# Patient Record
Sex: FEMALE | Race: WHITE | NOT HISPANIC OR LATINO | Employment: OTHER | URBAN - METROPOLITAN AREA
[De-identification: names, ages, dates, MRNs, and addresses within clinical notes are randomized per-mention and may not be internally consistent; named-entity substitution may affect disease eponyms.]

---

## 2017-05-02 RX ORDER — DIGOXIN 250 MCG
TABLET ORAL
Qty: 30 TABLET | Refills: 5 | Status: SHIPPED | OUTPATIENT
Start: 2017-05-02 | End: 2017-10-26 | Stop reason: SDUPTHER

## 2017-07-28 ENCOUNTER — HOSPITAL ENCOUNTER (OUTPATIENT)
Dept: CARDIOLOGY | Facility: HOSPITAL | Age: 67
Discharge: HOME | End: 2017-07-28
Admitting: INTERNAL MEDICINE

## 2017-07-28 PROCEDURE — 36415 COLL VENOUS BLD VENIPUNCTURE: CPT | Performed by: INTERNAL MEDICINE

## 2017-07-28 PROCEDURE — 80053 COMPREHEN METABOLIC PANEL: CPT | Performed by: INTERNAL MEDICINE

## 2017-07-28 PROCEDURE — 80061 LIPID PANEL: CPT | Performed by: INTERNAL MEDICINE

## 2017-08-03 ENCOUNTER — OFFICE VISIT (OUTPATIENT)
Dept: CARDIOLOGY | Facility: CLINIC | Age: 67
End: 2017-08-03

## 2017-08-03 VITALS
WEIGHT: 227 LBS | HEIGHT: 67 IN | HEART RATE: 74 BPM | SYSTOLIC BLOOD PRESSURE: 148 MMHG | DIASTOLIC BLOOD PRESSURE: 70 MMHG | BODY MASS INDEX: 35.63 KG/M2

## 2017-08-03 DIAGNOSIS — R07.2 PRECORDIAL PAIN: ICD-10-CM

## 2017-08-03 DIAGNOSIS — R01.1 MURMUR: ICD-10-CM

## 2017-08-03 DIAGNOSIS — I49.1 PREMATURE ATRIAL CONTRACTION: ICD-10-CM

## 2017-08-03 DIAGNOSIS — I47.1 SUPRAVENTRICULAR TACHYCARDIA (HCC): Primary | ICD-10-CM

## 2017-08-03 DIAGNOSIS — E78.5 HYPERLIPIDEMIA, UNSPECIFIED HYPERLIPIDEMIA TYPE: ICD-10-CM

## 2017-08-03 DIAGNOSIS — R94.39 ABNORMAL STRESS ECG WITH TREADMILL: ICD-10-CM

## 2017-08-03 PROCEDURE — 93000 ELECTROCARDIOGRAM COMPLETE: CPT | Performed by: INTERNAL MEDICINE

## 2017-08-03 PROCEDURE — 99213 OFFICE O/P EST LOW 20 MIN: CPT | Performed by: INTERNAL MEDICINE

## 2017-08-03 NOTE — PROGRESS NOTES
Subjective:       Delaney Dunlap is a 67 y.o. female who here for follow up    CC  Follow-up for the chest pains supraventricular tachycardia as well as hyperlipidemia  HPI  67 his old white female with known history of supraventricular tachycardia, PACs hyperlipidemia as well as atypical chest pain here for the follow-up, continues to complains of shortness of breath on moderate exertion no different than before, no chest tightness in chest no palpitation no syncopal near-syncopal episodes     Problem List Items Addressed This Visit        Cardiovascular and Mediastinum    Hyperlipidemia    Relevant Orders    Treadmill Stress Test    Adult Transthoracic Echo Complete    Premature atrial contraction    Relevant Orders    ECG 12 Lead    Treadmill Stress Test    Adult Transthoracic Echo Complete    Supraventricular tachycardia - Primary    Relevant Orders    Treadmill Stress Test    Adult Transthoracic Echo Complete    Murmur    Relevant Orders    Treadmill Stress Test    Adult Transthoracic Echo Complete      Other Visit Diagnoses     Precordial pain         Relevant Orders    Treadmill Stress Test        .    The following portions of the patient's history were reviewed and updated as appropriate: allergies, current medications, past family history, past medical history, past social history, past surgical history and problem list.    Past Medical History:   Diagnosis Date   • Hyperlipidemia    • SVT (supraventricular tachycardia)     reports that she has never smoked. She has never used smokeless tobacco. She reports that she does not drink alcohol or use illicit drugs.  Family History   Problem Relation Age of Onset   • Heart attack Mother    • Arrhythmia Father    • Angina Father        Review of Systems  Constitutional: No wt loss, fever, fatigue  Gastrointestinal: No nausea, abdominal pain  Behavioral/Psych: No insomnia or anxiety   Cardiovascular No chest tightness  Objective:       Physical Exam           "   Physical Exam  /70  Pulse 74  Ht 67\" (170.2 cm)  Wt 227 lb (103 kg)  BMI 35.55 kg/m2    General appearance: NAD, conversant   Eyes: anicteric sclerae, moist conjunctivae; no lid-lag; PERRLA   HENT: Atraumatic; oropharynx clear with moist mucous membranes and no mucosal ulcerations;  normal hard and soft palate   Neck: Trachea midline; FROM, supple, no thyromegaly or lymphadenopathy   Lungs: CTA, with normal respiratory effort and no intercostal retractions   CV: S1-S2 regular, sys murmurs, no rub, no gallop   Abdomen: Soft, non-tender; no masses or HSM   Extremities: No peripheral edema or extremity lymphadenopathy  Skin: Normal temperature, turgor and texture; no rash, ulcers or subcutaneous nodules   Psych: Appropriate affect, alert and oriented to person, place and time           Cardiographics  @  ECG 12 Lead  Date/Time: 8/3/2017 2:06 PM  Performed by: LAUREL GUERIN  Authorized by: LAUREL GUERNI   Comparison: compared with previous ECG   Similar to previous ECG  Rhythm: sinus rhythm  ST Segments: ST segments normal  Clinical impression: normal ECG             Ref Range & Units 6d ago     Total Cholesterol 0 - 200 mg/dL 176   Triglycerides 0 - 150 mg/dL 234 (H)   HDL Cholesterol 40 - 60 mg/dL 35 (L)   LDL Cholesterol  0 - 100 mg/dL 94   VLDL Cholesterol 5 - 40 mg/dL 46.8 (H)   LDL/HDL Ratio  2.69     Glucose 65 - 99 mg/dL 165 (H)   BUN 8 - 23 mg/dL 13   Creatinine 0.57 - 1.00 mg/dL 0.77   Sodium 136 - 145 mmol/L 140   Potassium 3.5 - 5.2 mmol/L 4.8   Chloride 98 - 107 mmol/L 101   CO2 22.0 - 29.0 mmol/L 25.8   Calcium 8.6 - 10.5 mg/dL 9.6   Total Protein 6.0 - 8.5 g/dL 7.5   Albumin 3.50 - 5.20 g/dL 4.80   ALT (SGPT) 1 - 33 U/L 27   AST (SGOT) 1 - 32 U/L 19   Alkaline Phosphatase 39 - 117 U/L 91   Total Bilirubin 0.1 - 1.2 mg/dL 0.5   eGFR Non African Amer >60 mL/min/1.73 75   Globulin gm/dL 2.7   A/G Ratio g/dL 1.8   BUN/Creatinine Ratio 7.0 - 25.0 16.9   Anion Gap mmol/L 13.2 "   Resulting Agency  [facility]      Specimen Collected: 07/28/17  8:57 AM   Last Resulted: 07/28/17  4:42 PM            Echocardiogram:        Current Outpatient Prescriptions:   •  atorvastatin (LIPITOR) 10 MG tablet, Take 1 tablet by mouth Daily., Disp: 30 tablet, Rfl: 10  •  digoxin (LANOXIN) 250 MCG tablet, TAKE ONE TABLET BY MOUTH DAILY, Disp: 30 tablet, Rfl: 5  •  travoprost, YAHAIRA free, (TRAVATAN) 0.004 % solution ophthalmic solution, 1 drop every evening. in affected eye(s), Disp: , Rfl:    Assessment:        Patient Active Problem List   Diagnosis   • Hyperlipidemia   • Premature atrial contraction   • Supraventricular tachycardia   • Dyslipidemia               Plan:            ICD-10-CM ICD-9-CM   1. Supraventricular tachycardia I47.1 427.89   2. Premature atrial contraction I49.1 427.61   3. Hyperlipidemia, unspecified hyperlipidemia type E78.5 272.4   4. Murmur R01.1 785.2   5. Precordial pain  R07.2 786.51     1. Supraventricular tachycardia  Under control at this stage  - Treadmill Stress Test  - Adult Transthoracic Echo Complete    2. Premature atrial contraction  Well-controlled  - ECG 12 Lead  - Treadmill Stress Test  - Adult Transthoracic Echo Complete    3. Hyperlipidemia, unspecified hyperlipidemia type  Counseling has been done  - Treadmill Stress Test  - Adult Transthoracic Echo Complete    4. Murmur    - Treadmill Stress Test  - Adult Transthoracic Echo Complete    5. Precordial pain     - Treadmill Stress Test  ett/ echo    See in 1 yr with labs    Newly diagnosed diabetes    Pros and cons of ASA in primary and secondary prevention of CAD has been discussed.  Risks of bleeding and other possible side effects have been discussed, Diff advantages and disadvantages of 325 vs 81  Mg of ASA were discussed, at this stage it has been recommended to start ASA 81 mg /day     COUNSELING:    Delaney Peña was given to patient for the following topics: diagnostic results, risk factor  reductions, impressions, risks and benefits of treatment options and importance of treatment compliance .       SMOKING COUNSELING:    Counseling given: Not Answered      EMR Dragon/Transcription disclaimer:   Much of this encounter note is an electronic transcription/translation of spoken language to printed text. The electronic translation of spoken language may permit erroneous, or at times, nonsensical words or phrases to be inadvertently transcribed; Although I have reviewed the note for such errors, some may still exist.

## 2017-08-16 ENCOUNTER — HOSPITAL ENCOUNTER (OUTPATIENT)
Dept: CARDIOLOGY | Facility: HOSPITAL | Age: 67
Discharge: HOME | End: 2017-08-16
Attending: INTERNAL MEDICINE

## 2017-08-16 ENCOUNTER — HOSPITAL ENCOUNTER (OUTPATIENT)
Dept: CARDIOLOGY | Facility: HOSPITAL | Age: 67
Discharge: HOME | End: 2017-08-16
Attending: INTERNAL MEDICINE | Admitting: INTERNAL MEDICINE

## 2017-08-16 VITALS
RESPIRATION RATE: 20 BRPM | SYSTOLIC BLOOD PRESSURE: 130 MMHG | DIASTOLIC BLOOD PRESSURE: 72 MMHG | OXYGEN SATURATION: 98 % | HEART RATE: 63 BPM

## 2017-08-16 VITALS
SYSTOLIC BLOOD PRESSURE: 134 MMHG | BODY MASS INDEX: 36.6 KG/M2 | DIASTOLIC BLOOD PRESSURE: 76 MMHG | WEIGHT: 233.2 LBS | HEART RATE: 68 BPM | HEIGHT: 67 IN

## 2017-08-16 LAB
BH CV ECHO MEAS - ACS: 1.8 CM
BH CV ECHO MEAS - AO MAX PG (FULL): 11.3 MMHG
BH CV ECHO MEAS - AO MAX PG: 15.2 MMHG
BH CV ECHO MEAS - AO MEAN PG (FULL): 6.5 MMHG
BH CV ECHO MEAS - AO MEAN PG: 8.5 MMHG
BH CV ECHO MEAS - AO ROOT AREA (BSA CORRECTED): 1.4
BH CV ECHO MEAS - AO ROOT AREA: 7.1 CM^2
BH CV ECHO MEAS - AO ROOT DIAM: 3 CM
BH CV ECHO MEAS - AO V2 MAX: 195 CM/SEC
BH CV ECHO MEAS - AO V2 MEAN: 136 CM/SEC
BH CV ECHO MEAS - AO V2 VTI: 43.5 CM
BH CV ECHO MEAS - AVA(I,A): 1.7 CM^2
BH CV ECHO MEAS - AVA(I,D): 1.7 CM^2
BH CV ECHO MEAS - AVA(V,A): 1.4 CM^2
BH CV ECHO MEAS - AVA(V,D): 1.4 CM^2
BH CV ECHO MEAS - BSA(HAYCOCK): 2.3 M^2
BH CV ECHO MEAS - BSA: 2.2 M^2
BH CV ECHO MEAS - BZI_BMI: 36.5 KILOGRAMS/M^2
BH CV ECHO MEAS - BZI_METRIC_HEIGHT: 170.2 CM
BH CV ECHO MEAS - BZI_METRIC_WEIGHT: 105.7 KG
BH CV ECHO MEAS - CONTRAST EF (2CH): 63.2 ML/M^2
BH CV ECHO MEAS - CONTRAST EF 4CH: 70.8 ML/M^2
BH CV ECHO MEAS - EDV(CUBED): 117.6 ML
BH CV ECHO MEAS - EDV(MOD-SP2): 106 ML
BH CV ECHO MEAS - EDV(MOD-SP4): 96 ML
BH CV ECHO MEAS - EDV(TEICH): 112.8 ML
BH CV ECHO MEAS - EF(CUBED): 74.4 %
BH CV ECHO MEAS - EF(MOD-SP2): 63.2 %
BH CV ECHO MEAS - EF(MOD-SP4): 70.8 %
BH CV ECHO MEAS - EF(TEICH): 66.1 %
BH CV ECHO MEAS - ESV(CUBED): 30.1 ML
BH CV ECHO MEAS - ESV(MOD-SP2): 39 ML
BH CV ECHO MEAS - ESV(MOD-SP4): 28 ML
BH CV ECHO MEAS - ESV(TEICH): 38.2 ML
BH CV ECHO MEAS - FS: 36.5 %
BH CV ECHO MEAS - IVS/LVPW: 1.3
BH CV ECHO MEAS - IVSD: 1.3 CM
BH CV ECHO MEAS - LAT PEAK E' VEL: 11.7 CM/SEC
BH CV ECHO MEAS - LV DIASTOLIC VOL/BSA (35-75): 44.5 ML/M^2
BH CV ECHO MEAS - LV MASS(C)D: 213.3 GRAMS
BH CV ECHO MEAS - LV MASS(C)DI: 98.8 GRAMS/M^2
BH CV ECHO MEAS - LV MAX PG: 3.9 MMHG
BH CV ECHO MEAS - LV MEAN PG: 2 MMHG
BH CV ECHO MEAS - LV SYSTOLIC VOL/BSA (12-30): 13 ML/M^2
BH CV ECHO MEAS - LV V1 MAX: 99.3 CM/SEC
BH CV ECHO MEAS - LV V1 MEAN: 72.8 CM/SEC
BH CV ECHO MEAS - LV V1 VTI: 25.6 CM
BH CV ECHO MEAS - LVIDD: 4.9 CM
BH CV ECHO MEAS - LVIDS: 3.1 CM
BH CV ECHO MEAS - LVLD AP2: 8.2 CM
BH CV ECHO MEAS - LVLD AP4: 7.9 CM
BH CV ECHO MEAS - LVLS AP2: 5.9 CM
BH CV ECHO MEAS - LVLS AP4: 5.7 CM
BH CV ECHO MEAS - LVOT AREA (M): 2.8 CM^2
BH CV ECHO MEAS - LVOT AREA: 2.8 CM^2
BH CV ECHO MEAS - LVOT DIAM: 1.9 CM
BH CV ECHO MEAS - LVPWD: 1 CM
BH CV ECHO MEAS - MED PEAK E' VEL: 5.5 CM/SEC
BH CV ECHO MEAS - MR MAX PG: 98 MMHG
BH CV ECHO MEAS - MR MAX VEL: 494.5 CM/SEC
BH CV ECHO MEAS - MV A DUR: 0.14 SEC
BH CV ECHO MEAS - MV A MAX VEL: 94.3 CM/SEC
BH CV ECHO MEAS - MV DEC SLOPE: 449 CM/SEC^2
BH CV ECHO MEAS - MV DEC TIME: 0.26 SEC
BH CV ECHO MEAS - MV E MAX VEL: 113 CM/SEC
BH CV ECHO MEAS - MV E/A: 1.2
BH CV ECHO MEAS - MV MAX PG: 7.6 MMHG
BH CV ECHO MEAS - MV MEAN PG: 4 MMHG
BH CV ECHO MEAS - MV P1/2T MAX VEL: 141 CM/SEC
BH CV ECHO MEAS - MV P1/2T: 92 MSEC
BH CV ECHO MEAS - MV V2 MAX: 138 CM/SEC
BH CV ECHO MEAS - MV V2 MEAN: 91.7 CM/SEC
BH CV ECHO MEAS - MV V2 VTI: 41.4 CM
BH CV ECHO MEAS - MVA P1/2T LCG: 1.6 CM^2
BH CV ECHO MEAS - MVA(P1/2T): 2.4 CM^2
BH CV ECHO MEAS - MVA(VTI): 1.8 CM^2
BH CV ECHO MEAS - PA MAX PG (FULL): 3 MMHG
BH CV ECHO MEAS - PA MAX PG: 4.5 MMHG
BH CV ECHO MEAS - PA V2 MAX: 106 CM/SEC
BH CV ECHO MEAS - PULM A REVS DUR: 0.14 SEC
BH CV ECHO MEAS - PULM A REVS VEL: 28.6 CM/SEC
BH CV ECHO MEAS - PULM DIAS VEL: 55 CM/SEC
BH CV ECHO MEAS - PULM S/D: 0.88
BH CV ECHO MEAS - PULM SYS VEL: 48.5 CM/SEC
BH CV ECHO MEAS - PVA(V,A): 2.6 CM^2
BH CV ECHO MEAS - PVA(V,D): 2.6 CM^2
BH CV ECHO MEAS - QP/QS: 0.85
BH CV ECHO MEAS - RAP SYSTOLE: 10 MMHG
BH CV ECHO MEAS - RV MAX PG: 1.4 MMHG
BH CV ECHO MEAS - RV MEAN PG: 1 MMHG
BH CV ECHO MEAS - RV V1 MAX: 60.2 CM/SEC
BH CV ECHO MEAS - RV V1 MEAN: 44.7 CM/SEC
BH CV ECHO MEAS - RV V1 VTI: 13.7 CM
BH CV ECHO MEAS - RVOT AREA: 4.5 CM^2
BH CV ECHO MEAS - RVOT DIAM: 2.4 CM
BH CV ECHO MEAS - RVSP: 18.4 MMHG
BH CV ECHO MEAS - SI(AO): 142.3 ML/M^2
BH CV ECHO MEAS - SI(CUBED): 40.6 ML/M^2
BH CV ECHO MEAS - SI(LVOT): 33.6 ML/M^2
BH CV ECHO MEAS - SI(MOD-SP2): 31.1 ML/M^2
BH CV ECHO MEAS - SI(MOD-SP4): 31.5 ML/M^2
BH CV ECHO MEAS - SI(TEICH): 34.6 ML/M^2
BH CV ECHO MEAS - SV(AO): 307.1 ML
BH CV ECHO MEAS - SV(CUBED): 87.6 ML
BH CV ECHO MEAS - SV(LVOT): 72.6 ML
BH CV ECHO MEAS - SV(MOD-SP2): 67 ML
BH CV ECHO MEAS - SV(MOD-SP4): 68 ML
BH CV ECHO MEAS - SV(RVOT): 62 ML
BH CV ECHO MEAS - SV(TEICH): 74.6 ML
BH CV ECHO MEAS - TAPSE (>1.6): 2.2 CM2
BH CV ECHO MEAS - TR MAX VEL: 145 CM/SEC
BH CV STRESS BP STAGE 1: NORMAL
BH CV STRESS BP STAGE 2: NORMAL
BH CV STRESS BP STAGE 3: NORMAL
BH CV STRESS DURATION MIN STAGE 1: 3
BH CV STRESS DURATION MIN STAGE 2: 3
BH CV STRESS DURATION MIN STAGE 3: 1
BH CV STRESS DURATION SEC STAGE 1: 0
BH CV STRESS DURATION SEC STAGE 2: 0
BH CV STRESS DURATION SEC STAGE 3: 13
BH CV STRESS GRADE STAGE 1: 10
BH CV STRESS GRADE STAGE 2: 12
BH CV STRESS GRADE STAGE 3: 14
BH CV STRESS HR STAGE 1: 108
BH CV STRESS HR STAGE 2: 134
BH CV STRESS HR STAGE 3: 150
BH CV STRESS METS STAGE 1: 4.6
BH CV STRESS METS STAGE 2: 7
BH CV STRESS METS STAGE 3: 8.2
BH CV STRESS O2 STAGE 3: 97
BH CV STRESS PROTOCOL 1: NORMAL
BH CV STRESS RECOVERY BP: NORMAL MMHG
BH CV STRESS RECOVERY HR: 89 BPM
BH CV STRESS RECOVERY O2: 99 %
BH CV STRESS SPEED STAGE 1: 1.7
BH CV STRESS SPEED STAGE 2: 2.5
BH CV STRESS SPEED STAGE 3: 3.1
BH CV STRESS STAGE 1: 1
BH CV STRESS STAGE 2: 2
BH CV STRESS STAGE 3: 3
BH CV XLRA - RV BASE: 3.8 CM
BH CV XLRA - RV LENGTH: 7.8 CM
BH CV XLRA - RV MID: 2.5 CM
BH CV XLRA - TDI S': 12.5 CM/SEC
LEFT ATRIUM VOLUME INDEX: 35.2 ML/M2
MAXIMAL PREDICTED HEART RATE: 154 BPM
PERCENT MAX PREDICTED HR: 97.4 %
STRESS BASELINE BP: NORMAL MMHG
STRESS BASELINE HR: 74 BPM
STRESS O2 SAT REST: 98 %
STRESS PERCENT HR: 115 %
STRESS POST ESTIMATED WORKLOAD: 8.2 METS
STRESS POST EXERCISE DUR MIN: 7 MIN
STRESS POST EXERCISE DUR SEC: 13 SEC
STRESS POST O2 SAT PEAK: 97 %
STRESS POST PEAK BP: NORMAL MMHG
STRESS POST PEAK HR: 150 BPM
STRESS TARGET HR: 131 BPM

## 2017-08-16 PROCEDURE — 93017 CV STRESS TEST TRACING ONLY: CPT

## 2017-08-16 PROCEDURE — 93306 TTE W/DOPPLER COMPLETE: CPT | Performed by: INTERNAL MEDICINE

## 2017-08-16 PROCEDURE — 93018 CV STRESS TEST I&R ONLY: CPT | Performed by: INTERNAL MEDICINE

## 2017-08-16 PROCEDURE — 93306 TTE W/DOPPLER COMPLETE: CPT

## 2017-08-16 PROCEDURE — 93016 CV STRESS TEST SUPVJ ONLY: CPT | Performed by: INTERNAL MEDICINE

## 2017-08-24 ENCOUNTER — HOSPITAL ENCOUNTER (OUTPATIENT)
Dept: NUCLEAR MEDICINE | Facility: HOSPITAL | Age: 67
Discharge: HOME | End: 2017-08-24
Attending: INTERNAL MEDICINE

## 2017-08-24 LAB
BH CV STRESS BP STAGE 1: NORMAL
BH CV STRESS BP STAGE 2: NORMAL
BH CV STRESS BP STAGE 3: NORMAL
BH CV STRESS DURATION MIN STAGE 1: 3
BH CV STRESS DURATION MIN STAGE 2: 3
BH CV STRESS DURATION MIN STAGE 3: 0
BH CV STRESS DURATION SEC STAGE 1: 0
BH CV STRESS DURATION SEC STAGE 2: 0
BH CV STRESS DURATION SEC STAGE 3: 23
BH CV STRESS GRADE STAGE 1: 10
BH CV STRESS GRADE STAGE 2: 12
BH CV STRESS GRADE STAGE 3: 14
BH CV STRESS HR STAGE 1: 113
BH CV STRESS HR STAGE 2: 134
BH CV STRESS HR STAGE 3: 137
BH CV STRESS METS STAGE 1: 5
BH CV STRESS METS STAGE 2: 7.5
BH CV STRESS METS STAGE 3: 10
BH CV STRESS PROTOCOL 1: NORMAL
BH CV STRESS RECOVERY BP: NORMAL MMHG
BH CV STRESS RECOVERY HR: 83 BPM
BH CV STRESS SPEED STAGE 1: 1.7
BH CV STRESS SPEED STAGE 2: 2.5
BH CV STRESS SPEED STAGE 3: 3.4
BH CV STRESS STAGE 1: 1
BH CV STRESS STAGE 2: 2
BH CV STRESS STAGE 3: 3
LV EF NUC BP: 60 %
MAXIMAL PREDICTED HEART RATE: 154 BPM
PERCENT MAX PREDICTED HR: 89.61 %
STRESS BASELINE BP: NORMAL MMHG
STRESS BASELINE HR: 67 BPM
STRESS PERCENT HR: 105 %
STRESS POST ESTIMATED WORKLOAD: 7.6 METS
STRESS POST EXERCISE DUR MIN: 6 MIN
STRESS POST EXERCISE DUR SEC: 23 SEC
STRESS POST PEAK BP: NORMAL MMHG
STRESS POST PEAK HR: 138 BPM
STRESS TARGET HR: 131 BPM

## 2017-08-24 PROCEDURE — 78452 HT MUSCLE IMAGE SPECT MULT: CPT | Performed by: INTERNAL MEDICINE

## 2017-08-24 PROCEDURE — A9500 TC99M SESTAMIBI: HCPCS | Performed by: INTERNAL MEDICINE

## 2017-08-24 PROCEDURE — 78452 HT MUSCLE IMAGE SPECT MULT: CPT

## 2017-08-24 PROCEDURE — 93017 CV STRESS TEST TRACING ONLY: CPT

## 2017-08-24 PROCEDURE — 93016 CV STRESS TEST SUPVJ ONLY: CPT | Performed by: INTERNAL MEDICINE

## 2017-08-24 PROCEDURE — 0 TECHNETIUM SESTAMIBI: Performed by: INTERNAL MEDICINE

## 2017-08-24 PROCEDURE — 93018 CV STRESS TEST I&R ONLY: CPT | Performed by: INTERNAL MEDICINE

## 2017-08-24 RX ADMIN — TECHNETIUM TC-99M SESTAMIBI 1 DOSE: 1 INJECTION INTRAVENOUS at 07:45

## 2017-08-24 RX ADMIN — TECHNETIUM TC-99M SESTAMIBI 1 DOSE: 1 INJECTION INTRAVENOUS at 09:25

## 2017-08-25 RX ORDER — ATORVASTATIN CALCIUM 10 MG/1
TABLET, FILM COATED ORAL
Qty: 30 TABLET | Refills: 9 | Status: SHIPPED | OUTPATIENT
Start: 2017-08-25 | End: 2018-04-21 | Stop reason: SDUPTHER

## 2017-10-26 RX ORDER — DIGOXIN 250 MCG
250 TABLET ORAL
Qty: 30 TABLET | Refills: 5 | Status: SHIPPED | OUTPATIENT
Start: 2017-10-26 | End: 2017-12-01 | Stop reason: SDUPTHER

## 2017-11-30 ENCOUNTER — TELEPHONE (OUTPATIENT)
Dept: CARDIOLOGY | Age: 67
End: 2017-11-30

## 2017-12-01 RX ORDER — DIGOXIN 250 MCG
250 TABLET ORAL
Qty: 30 TABLET | Refills: 5 | Status: SHIPPED | OUTPATIENT
Start: 2017-12-01 | End: 2018-07-20 | Stop reason: SDUPTHER

## 2018-04-23 RX ORDER — DIGOXIN 250 MCG
TABLET ORAL
Qty: 30 TABLET | Refills: 0 | Status: SHIPPED | OUTPATIENT
Start: 2018-04-23 | End: 2018-05-24 | Stop reason: SDUPTHER

## 2018-04-23 RX ORDER — ATORVASTATIN CALCIUM 10 MG/1
TABLET, FILM COATED ORAL
Qty: 90 TABLET | Refills: 0 | Status: SHIPPED | OUTPATIENT
Start: 2018-04-23 | End: 2018-07-20 | Stop reason: SDUPTHER

## 2018-05-25 RX ORDER — DIGOXIN 250 MCG
TABLET ORAL
Qty: 30 TABLET | Refills: 1 | Status: SHIPPED | OUTPATIENT
Start: 2018-05-25 | End: 2019-04-03 | Stop reason: SDUPTHER

## 2018-07-20 ENCOUNTER — HOSPITAL ENCOUNTER (OUTPATIENT)
Dept: CARDIOLOGY | Facility: HOSPITAL | Age: 68
Discharge: HOME | End: 2018-07-20
Admitting: INTERNAL MEDICINE

## 2018-07-20 DIAGNOSIS — E78.5 HYPERLIPIDEMIA, UNSPECIFIED HYPERLIPIDEMIA TYPE: ICD-10-CM

## 2018-07-20 DIAGNOSIS — I47.1 SUPRAVENTRICULAR TACHYCARDIA (HCC): ICD-10-CM

## 2018-07-20 LAB
ALBUMIN SERPL-MCNC: 4.8 G/DL (ref 3.5–5.2)
ALBUMIN/GLOB SERPL: 1.8 G/DL
ALP SERPL-CCNC: 94 U/L (ref 39–117)
ALT SERPL W P-5'-P-CCNC: 38 U/L (ref 1–33)
ANION GAP SERPL CALCULATED.3IONS-SCNC: 14.2 MMOL/L
AST SERPL-CCNC: 29 U/L (ref 1–32)
BILIRUB SERPL-MCNC: 0.5 MG/DL (ref 0.1–1.2)
BUN BLD-MCNC: 14 MG/DL (ref 8–23)
BUN/CREAT SERPL: 20 (ref 7–25)
CALCIUM SPEC-SCNC: 9.9 MG/DL (ref 8.6–10.5)
CHLORIDE SERPL-SCNC: 100 MMOL/L (ref 98–107)
CHOLEST SERPL-MCNC: 178 MG/DL (ref 0–200)
CO2 SERPL-SCNC: 24.8 MMOL/L (ref 22–29)
CREAT BLD-MCNC: 0.7 MG/DL (ref 0.57–1)
GFR SERPL CREATININE-BSD FRML MDRD: 83 ML/MIN/1.73
GLOBULIN UR ELPH-MCNC: 2.6 GM/DL
GLUCOSE BLD-MCNC: 221 MG/DL (ref 65–99)
HDLC SERPL-MCNC: 40 MG/DL (ref 40–60)
LDLC SERPL CALC-MCNC: 90 MG/DL (ref 0–100)
LDLC/HDLC SERPL: 2.26 {RATIO}
POTASSIUM BLD-SCNC: 4.7 MMOL/L (ref 3.5–5.2)
PROT SERPL-MCNC: 7.4 G/DL (ref 6–8.5)
SODIUM BLD-SCNC: 139 MMOL/L (ref 136–145)
TRIGL SERPL-MCNC: 238 MG/DL (ref 0–150)
VLDLC SERPL-MCNC: 47.6 MG/DL (ref 5–40)

## 2018-07-20 PROCEDURE — 80061 LIPID PANEL: CPT | Performed by: INTERNAL MEDICINE

## 2018-07-20 PROCEDURE — 80053 COMPREHEN METABOLIC PANEL: CPT | Performed by: INTERNAL MEDICINE

## 2018-07-20 RX ORDER — ATORVASTATIN CALCIUM 10 MG/1
10 TABLET, FILM COATED ORAL DAILY
Qty: 90 TABLET | Refills: 2 | Status: SHIPPED | OUTPATIENT
Start: 2018-07-20 | End: 2018-10-08

## 2018-07-20 RX ORDER — DIGOXIN 250 MCG
250 TABLET ORAL
Qty: 90 TABLET | Refills: 2 | Status: SHIPPED | OUTPATIENT
Start: 2018-07-20 | End: 2018-07-30 | Stop reason: SDUPTHER

## 2018-07-30 ENCOUNTER — OFFICE VISIT (OUTPATIENT)
Dept: CARDIOLOGY | Facility: CLINIC | Age: 68
End: 2018-07-30

## 2018-07-30 VITALS
HEART RATE: 73 BPM | DIASTOLIC BLOOD PRESSURE: 86 MMHG | HEIGHT: 67 IN | WEIGHT: 226 LBS | SYSTOLIC BLOOD PRESSURE: 159 MMHG | BODY MASS INDEX: 35.47 KG/M2

## 2018-07-30 DIAGNOSIS — R89.9 ABNORMAL LABORATORY TEST: ICD-10-CM

## 2018-07-30 DIAGNOSIS — E78.5 DYSLIPIDEMIA: ICD-10-CM

## 2018-07-30 DIAGNOSIS — R03.0 BORDERLINE SYSTOLIC HTN: ICD-10-CM

## 2018-07-30 DIAGNOSIS — I47.1 SUPRAVENTRICULAR TACHYCARDIA (HCC): Primary | ICD-10-CM

## 2018-07-30 PROCEDURE — 99213 OFFICE O/P EST LOW 20 MIN: CPT | Performed by: INTERNAL MEDICINE

## 2018-07-30 PROCEDURE — 93000 ELECTROCARDIOGRAM COMPLETE: CPT | Performed by: INTERNAL MEDICINE

## 2018-10-04 ENCOUNTER — OFFICE VISIT (OUTPATIENT)
Dept: ENDOCRINOLOGY | Age: 68
End: 2018-10-04

## 2018-10-04 VITALS
WEIGHT: 225 LBS | BODY MASS INDEX: 35.31 KG/M2 | SYSTOLIC BLOOD PRESSURE: 124 MMHG | RESPIRATION RATE: 16 BRPM | HEIGHT: 67 IN | DIASTOLIC BLOOD PRESSURE: 72 MMHG

## 2018-10-04 DIAGNOSIS — R53.82 CHRONIC FATIGUE: ICD-10-CM

## 2018-10-04 DIAGNOSIS — E78.5 HYPERLIPIDEMIA, UNSPECIFIED HYPERLIPIDEMIA TYPE: ICD-10-CM

## 2018-10-04 DIAGNOSIS — E66.9 CLASS 2 OBESITY WITHOUT SERIOUS COMORBIDITY WITH BODY MASS INDEX (BMI) OF 36.0 TO 36.9 IN ADULT, UNSPECIFIED OBESITY TYPE: ICD-10-CM

## 2018-10-04 DIAGNOSIS — E55.9 VITAMIN D DEFICIENCY: ICD-10-CM

## 2018-10-04 DIAGNOSIS — E11.9 TYPE 2 DIABETES MELLITUS WITHOUT COMPLICATION, WITHOUT LONG-TERM CURRENT USE OF INSULIN (HCC): Primary | ICD-10-CM

## 2018-10-04 DIAGNOSIS — R03.0 BORDERLINE SYSTOLIC HTN: ICD-10-CM

## 2018-10-04 PROBLEM — E66.812 CLASS 2 OBESITY WITHOUT SERIOUS COMORBIDITY WITH BODY MASS INDEX (BMI) OF 36.0 TO 36.9 IN ADULT: Status: ACTIVE | Noted: 2018-10-04

## 2018-10-04 PROCEDURE — 99205 OFFICE O/P NEW HI 60 MIN: CPT | Performed by: INTERNAL MEDICINE

## 2018-10-04 RX ORDER — MV-MN/OM3/DHA/EPA/FISH/LUT/ZEA 250-5-1 MG
CAPSULE ORAL
COMMUNITY
End: 2019-02-13 | Stop reason: SDUPTHER

## 2018-10-04 RX ORDER — BLOOD SUGAR DIAGNOSTIC
STRIP MISCELLANEOUS
Qty: 60 EACH | Refills: 11 | Status: SHIPPED | OUTPATIENT
Start: 2018-10-04 | End: 2018-10-08 | Stop reason: SDUPTHER

## 2018-10-04 RX ORDER — EMPAGLIFLOZIN, METFORMIN HYDROCHLORIDE 12.5; 1 MG/1; MG/1
2 TABLET, EXTENDED RELEASE ORAL DAILY
Qty: 60 TABLET | Refills: 5 | Status: SHIPPED | OUTPATIENT
Start: 2018-10-04 | End: 2019-04-22 | Stop reason: SDUPTHER

## 2018-10-04 RX ORDER — ANTIOX #8/OM3/DHA/EPA/LUT/ZEAX 250-2.5 MG
1 CAPSULE ORAL 2 TIMES DAILY
COMMUNITY

## 2018-10-04 RX ORDER — LANCETS
EACH MISCELLANEOUS
Qty: 60 EACH | Refills: 5 | Status: SHIPPED | OUTPATIENT
Start: 2018-10-04 | End: 2018-10-08 | Stop reason: SDUPTHER

## 2018-10-04 RX ORDER — EXENATIDE 2 MG/.85ML
2 INJECTION, SUSPENSION, EXTENDED RELEASE SUBCUTANEOUS WEEKLY
Qty: 4 PEN | Refills: 5 | Status: SHIPPED | OUTPATIENT
Start: 2018-10-04 | End: 2019-02-26 | Stop reason: SDUPTHER

## 2018-10-04 NOTE — PROGRESS NOTES
"Subjective   Delaney Dunlap is a 68 y.o. female seen as a new patient for abnormal labs. Patient states that cardiologist told her she had diabetes but she does not have an A1C result. She is not checking BG. She is having frequency and urgency.     History of Present Illness this is a 68-year-old female who has been told to have diabetes on a routine laboratory evaluation warm on 07/20/2018 and her blood sugar was 220.  She is on known patient with borderline high blood pressure and atrial fibrillation and heart failure with dyslipidemia.  She says she has been told to have low levels of vitamin D for a very long time.  Diabetes runs in her family from parents and siblings and also there is high incidence of heart disease and stroke in her family.  She expressed frustration with the fact that she continues to gain weight with very little success in doing so.  She also complained about not having enough energy as she used to have.    /72   Resp 16   Ht 170.2 cm (67\")   Wt 102 kg (225 lb)   BMI 35.24 kg/m²      Allergies   Allergen Reactions   • Erythromycin    • Erythromycin Base    • Penicillins        Current Outpatient Prescriptions:   •  atorvastatin (LIPITOR) 10 MG tablet, Take 1 tablet by mouth Daily., Disp: 90 tablet, Rfl: 2  •  digoxin (LANOXIN) 250 MCG tablet, TAKE ONE TABLET BY MOUTH DAILY, Disp: 30 tablet, Rfl: 1  •  Multiple Vitamins-Minerals (MULTIVITAMIN WOMEN 50+) tablet, Take  by mouth., Disp: , Rfl:   •  Multiple Vitamins-Minerals (OCUVITE ADULT 50+) capsule, Take  by mouth., Disp: , Rfl:   •  multivitamins-minerals (PRESERVISION AREDS 2) capsule capsule, Take 1 capsule by mouth 2 (Two) Times a Day., Disp: , Rfl:   •  travoprost, YAHAIRA free, (TRAVATAN) 0.004 % solution ophthalmic solution, 1 drop every evening. in affected eye(s), Disp: , Rfl:       The following portions of the patient's history were reviewed and updated as appropriate: allergies, current medications, past family " history, past medical history, past social history, past surgical history and problem list.    Review of Systems   Constitutional: Negative.  Negative for fatigue.   HENT: Negative.    Eyes: Negative.    Respiratory: Negative.    Cardiovascular: Negative.  Negative for chest pain.   Gastrointestinal: Negative.    Endocrine: Negative.  Negative for polydipsia, polyphagia and polyuria.   Genitourinary: Positive for frequency and urgency.   Musculoskeletal: Negative.    Skin: Negative.  Negative for pallor.   Allergic/Immunologic: Negative.    Neurological: Negative.  Negative for dizziness, tremors, seizures, speech difficulty, weakness and headaches.   Hematological: Negative.    Psychiatric/Behavioral: Negative.  Negative for confusion. The patient is not nervous/anxious.        Objective   Physical Exam   Constitutional: She is oriented to person, place, and time. She appears well-developed and well-nourished. No distress.   HENT:   Head: Normocephalic and atraumatic.   Right Ear: External ear normal.   Left Ear: External ear normal.   Nose: Nose normal.   Mouth/Throat: Oropharynx is clear and moist. No oropharyngeal exudate.   Eyes: Pupils are equal, round, and reactive to light. Conjunctivae and EOM are normal. Right eye exhibits no discharge. Left eye exhibits no discharge. No scleral icterus.   Neck: Normal range of motion. Neck supple. No JVD present. No tracheal deviation present. No thyromegaly present.   Cardiovascular: Normal rate, regular rhythm, normal heart sounds and intact distal pulses.  Exam reveals no gallop and no friction rub.    No murmur heard.  Pulmonary/Chest: Effort normal and breath sounds normal. No stridor. No respiratory distress. She has no wheezes. She has no rales. She exhibits no tenderness.   Abdominal: Soft. Bowel sounds are normal. She exhibits no distension and no mass. There is no tenderness. There is no rebound and no guarding. No hernia.   Musculoskeletal: Normal range of  motion. She exhibits no edema, tenderness or deformity.   Lymphadenopathy:     She has no cervical adenopathy.   Neurological: She is alert and oriented to person, place, and time. She has normal reflexes. She displays normal reflexes. No cranial nerve deficit or sensory deficit. She exhibits normal muscle tone. Coordination normal.   Skin: Skin is warm and dry. No rash noted. She is not diaphoretic. No erythema. No pallor.   Diffuse vitiligo.   Psychiatric: She has a normal mood and affect. Her behavior is normal. Judgment and thought content normal.   Nursing note and vitals reviewed.        Assessment/Plan   Diagnoses and all orders for this visit:    Type 2 diabetes mellitus without complication, without long-term current use of insulin (CMS/[facility])  -     T4 & TSH ([facility])  -     Uric Acid  -     Vitamin D 25 Hydroxy  -     Comprehensive Metabolic Panel  -     C-Peptide  -     Hemoglobin A1c  -     MicroAlbumin, Urine, Random - Urine, Clean Catch  -     ACTH  -     Cortisol  -     Calcium, Ionized  -     Phosphorus  -     PTH, Intact  -     NMR LipoProfile  -     Cancel: T4 & TSH ([facility]); Future  -     Cancel: Uric Acid; Future  -     Cancel: Vitamin D 25 Hydroxy; Future  -     Cancel: Comprehensive Metabolic Panel; Future  -     Cancel: C-Peptide; Future  -     Cancel: Hemoglobin A1c; Future  -     Cancel: MicroAlbumin, Urine, Random - Urine, Clean Catch; Future  -     Cancel: NMR LipoProfile; Future  -     T4 & TSH ([facility]); Future  -     T3, Free; Future  -     T4, Free; Future  -     Uric Acid; Future  -     Vitamin D 25 Hydroxy; Future  -     Thyroglobulin With Anti-TG; Future  -     Comprehensive Metabolic Panel; Future  -     C-Peptide; Future  -     Hemoglobin A1c; Future  -     MicroAlbumin, Urine, Random - Urine, Clean Catch; Future  -     NMR LipoProfile; Future    Hyperlipidemia, unspecified hyperlipidemia type  -     T4 & TSH ([facility])  -     Uric Acid  -     Vitamin D 25 Hydroxy  -      Comprehensive Metabolic Panel  -     C-Peptide  -     Hemoglobin A1c  -     MicroAlbumin, Urine, Random - Urine, Clean Catch  -     ACTH  -     Cortisol  -     Calcium, Ionized  -     Phosphorus  -     PTH, Intact  -     NMR LipoProfile  -     Cancel: T4 & TSH ([facility]); Future  -     Cancel: Uric Acid; Future  -     Cancel: Vitamin D 25 Hydroxy; Future  -     Cancel: Comprehensive Metabolic Panel; Future  -     Cancel: C-Peptide; Future  -     Cancel: Hemoglobin A1c; Future  -     Cancel: MicroAlbumin, Urine, Random - Urine, Clean Catch; Future  -     Cancel: NMR LipoProfile; Future  -     T4 & TSH ([facility]); Future  -     T3, Free; Future  -     T4, Free; Future  -     Uric Acid; Future  -     Vitamin D 25 Hydroxy; Future  -     Thyroglobulin With Anti-TG; Future  -     Comprehensive Metabolic Panel; Future  -     C-Peptide; Future  -     Hemoglobin A1c; Future  -     MicroAlbumin, Urine, Random - Urine, Clean Catch; Future  -     NMR LipoProfile; Future    Borderline systolic HTN  -     T4 & TSH ([facility])  -     Uric Acid  -     Vitamin D 25 Hydroxy  -     Comprehensive Metabolic Panel  -     C-Peptide  -     Hemoglobin A1c  -     MicroAlbumin, Urine, Random - Urine, Clean Catch  -     ACTH  -     Cortisol  -     Calcium, Ionized  -     Phosphorus  -     PTH, Intact  -     NMR LipoProfile  -     Cancel: T4 & TSH ([facility]); Future  -     Cancel: Uric Acid; Future  -     Cancel: Vitamin D 25 Hydroxy; Future  -     Cancel: Comprehensive Metabolic Panel; Future  -     Cancel: C-Peptide; Future  -     Cancel: Hemoglobin A1c; Future  -     Cancel: MicroAlbumin, Urine, Random - Urine, Clean Catch; Future  -     Cancel: NMR LipoProfile; Future  -     T4 & TSH ([facility]); Future  -     T3, Free; Future  -     T4, Free; Future  -     Uric Acid; Future  -     Vitamin D 25 Hydroxy; Future  -     Thyroglobulin With Anti-TG; Future  -     Comprehensive Metabolic Panel; Future  -     C-Peptide; Future  -     Hemoglobin A1c;  Future  -     MicroAlbumin, Urine, Random - Urine, Clean Catch; Future  -     NMR LipoProfile; Future    Class 2 obesity without serious comorbidity with body mass index (BMI) of 36.0 to 36.9 in adult, unspecified obesity type  -     T4 & TSH ([facility])  -     Uric Acid  -     Vitamin D 25 Hydroxy  -     Comprehensive Metabolic Panel  -     C-Peptide  -     Hemoglobin A1c  -     MicroAlbumin, Urine, Random - Urine, Clean Catch  -     ACTH  -     Cortisol  -     Calcium, Ionized  -     Phosphorus  -     PTH, Intact  -     NMR LipoProfile  -     Cancel: T4 & TSH ([facility]); Future  -     Cancel: Uric Acid; Future  -     Cancel: Vitamin D 25 Hydroxy; Future  -     Cancel: Comprehensive Metabolic Panel; Future  -     Cancel: C-Peptide; Future  -     Cancel: Hemoglobin A1c; Future  -     Cancel: MicroAlbumin, Urine, Random - Urine, Clean Catch; Future  -     Cancel: NMR LipoProfile; Future  -     T4 & TSH ([facility]); Future  -     T3, Free; Future  -     T4, Free; Future  -     Uric Acid; Future  -     Vitamin D 25 Hydroxy; Future  -     Thyroglobulin With Anti-TG; Future  -     Comprehensive Metabolic Panel; Future  -     C-Peptide; Future  -     Hemoglobin A1c; Future  -     MicroAlbumin, Urine, Random - Urine, Clean Catch; Future  -     NMR LipoProfile; Future    Chronic fatigue  -     T4 & TSH ([facility])  -     Uric Acid  -     Vitamin D 25 Hydroxy  -     Comprehensive Metabolic Panel  -     C-Peptide  -     Hemoglobin A1c  -     MicroAlbumin, Urine, Random - Urine, Clean Catch  -     ACTH  -     Cortisol  -     Calcium, Ionized  -     Phosphorus  -     PTH, Intact  -     NMR LipoProfile  -     Cancel: T4 & TSH ([facility]); Future  -     Cancel: Uric Acid; Future  -     Cancel: Vitamin D 25 Hydroxy; Future  -     Cancel: Comprehensive Metabolic Panel; Future  -     Cancel: C-Peptide; Future  -     Cancel: Hemoglobin A1c; Future  -     Cancel: MicroAlbumin, Urine, Random - Urine, Clean Catch; Future  -     Cancel: NMR  LipoProfile; Future  -     T4 & TSH ([facility]); Future  -     T3, Free; Future  -     T4, Free; Future  -     Uric Acid; Future  -     Vitamin D 25 Hydroxy; Future  -     Thyroglobulin With Anti-TG; Future  -     Comprehensive Metabolic Panel; Future  -     C-Peptide; Future  -     Hemoglobin A1c; Future  -     MicroAlbumin, Urine, Random - Urine, Clean Catch; Future  -     NMR LipoProfile; Future    Vitamin D deficiency  -     T4 & TSH ([facility])  -     Uric Acid  -     Vitamin D 25 Hydroxy  -     Comprehensive Metabolic Panel  -     C-Peptide  -     Hemoglobin A1c  -     MicroAlbumin, Urine, Random - Urine, Clean Catch  -     ACTH  -     Cortisol  -     Calcium, Ionized  -     Phosphorus  -     PTH, Intact  -     NMR LipoProfile  -     Cancel: T4 & TSH ([facility]); Future  -     Cancel: Uric Acid; Future  -     Cancel: Vitamin D 25 Hydroxy; Future  -     Cancel: Comprehensive Metabolic Panel; Future  -     Cancel: C-Peptide; Future  -     Cancel: Hemoglobin A1c; Future  -     Cancel: MicroAlbumin, Urine, Random - Urine, Clean Catch; Future  -     Cancel: NMR LipoProfile; Future  -     T4 & TSH ([facility]); Future  -     T3, Free; Future  -     T4, Free; Future  -     Uric Acid; Future  -     Vitamin D 25 Hydroxy; Future  -     Thyroglobulin With Anti-TG; Future  -     Comprehensive Metabolic Panel; Future  -     C-Peptide; Future  -     Hemoglobin A1c; Future  -     MicroAlbumin, Urine, Random - Urine, Clean Catch; Future  -     NMR LipoProfile; Future    Other orders  -     BYDUREON BCISE 2 MG/0.85ML auto-injector injection; Inject 0.85 mL under the skin into the appropriate area as directed 1 (One) Time Per Week.  -     SYNJARDY XR 12.5-1000 MG tablet sustained-release 24 hour; Take 2 tablets by mouth Daily.  -     Lancets (ONETOUCH ULTRASOFT) lancets; Check blood glucose twice daily  -     ONETOUCH VERIO test strip; Check blood glucose twice daily Use as instructed               In summary I saw and examined this  68-year-old female for above-mentioned problems.  I reviewed her laboratory evaluation of 07/20/2008 15 and at this point we will go ahead and order an extensive laboratory evaluation 1 once the results come back we will go ahead and call for any possible modification or new medications.  However I am going to go ahead and is start her on Bydureon 1 pen or 2 mg once weekly as well as Synjardy XR5/1000 mg 2 tablets every morning and if tolerated with no complications after 4 weeks move up to 12,510 mg 2 tablets every morning.  I cautioned her to make sure she drinks plenty of water and cleans her genital area thoroughly after the use of toilet and sexual intercourse.  This office visit lasted 65 minutes, 35 minutes of which was a spent on face-to-face patient counseling and education.  She will see Ms. Marixa Pittman in 4 months or sooner if needed with laboratory evaluation prior to each office visit.

## 2018-10-06 LAB
25(OH)D3+25(OH)D2 SERPL-MCNC: 26.1 NG/ML (ref 30–100)
ACTH PLAS-MCNC: 20.9 PG/ML (ref 7.2–63.3)
ALBUMIN SERPL-MCNC: 5.1 G/DL (ref 3.5–5.2)
ALBUMIN/GLOB SERPL: 1.9 G/DL
ALP SERPL-CCNC: 111 U/L (ref 39–117)
ALT SERPL-CCNC: 30 U/L (ref 1–33)
AST SERPL-CCNC: 21 U/L (ref 1–32)
BILIRUB SERPL-MCNC: 0.5 MG/DL (ref 0.1–1.2)
BUN SERPL-MCNC: 13 MG/DL (ref 8–23)
BUN/CREAT SERPL: 15.9 (ref 7–25)
C PEPTIDE SERPL-MCNC: 6.1 NG/ML (ref 1.1–4.4)
CA-I SERPL ISE-MCNC: 5.2 MG/DL (ref 4.5–5.6)
CALCIUM SERPL-MCNC: 10.5 MG/DL (ref 8.6–10.5)
CHLORIDE SERPL-SCNC: 98 MMOL/L (ref 98–107)
CHOLEST SERPL-MCNC: 182 MG/DL (ref 100–199)
CO2 SERPL-SCNC: 26 MMOL/L (ref 22–29)
CORTIS SERPL-MCNC: 17.2 UG/DL
CREAT SERPL-MCNC: 0.82 MG/DL (ref 0.57–1)
GLOBULIN SER CALC-MCNC: 2.7 GM/DL
GLUCOSE SERPL-MCNC: 242 MG/DL (ref 65–99)
HBA1C MFR BLD: 9.5 % (ref 4.8–5.6)
HDL SERPL-SCNC: 30.2 UMOL/L
HDLC SERPL-MCNC: 36 MG/DL
LDL SERPL QN: 20.1 NM
LDL SERPL-SCNC: 1958 NMOL/L
LDL SMALL SERPL-SCNC: 1376 NMOL/L
LDLC SERPL CALC-MCNC: 100 MG/DL (ref 0–99)
MICROALBUMIN UR-MCNC: 92.1 UG/ML
PHOSPHATE SERPL-MCNC: 4.1 MG/DL (ref 2.5–4.5)
POTASSIUM SERPL-SCNC: 4.6 MMOL/L (ref 3.5–5.2)
PROT SERPL-MCNC: 7.8 G/DL (ref 6–8.5)
PTH-INTACT SERPL-MCNC: 32 PG/ML (ref 15–65)
SODIUM SERPL-SCNC: 140 MMOL/L (ref 136–145)
T4 SERPL-MCNC: 9.56 MCG/DL (ref 4.5–11.7)
TRIGL SERPL-MCNC: 228 MG/DL (ref 0–149)
TSH SERPL DL<=0.005 MIU/L-ACNC: 1.74 MIU/ML (ref 0.27–4.2)
URATE SERPL-MCNC: 5.3 MG/DL (ref 2.4–5.7)

## 2018-10-08 RX ORDER — ROSUVASTATIN CALCIUM 20 MG/1
20 TABLET, COATED ORAL NIGHTLY
Qty: 90 TABLET | Refills: 3 | Status: SHIPPED | OUTPATIENT
Start: 2018-10-08 | End: 2019-09-16 | Stop reason: SDUPTHER

## 2018-10-08 RX ORDER — LANCETS
EACH MISCELLANEOUS
Qty: 60 EACH | Refills: 5 | Status: SHIPPED | OUTPATIENT
Start: 2018-10-08 | End: 2019-08-26 | Stop reason: SDUPTHER

## 2018-10-08 RX ORDER — BLOOD SUGAR DIAGNOSTIC
STRIP MISCELLANEOUS
Qty: 60 EACH | Refills: 11 | Status: SHIPPED | OUTPATIENT
Start: 2018-10-08 | End: 2019-09-16 | Stop reason: SDUPTHER

## 2018-10-08 RX ORDER — ERGOCALCIFEROL 1.25 MG/1
50000 CAPSULE ORAL 2 TIMES WEEKLY
Qty: 26 CAPSULE | Refills: 3 | Status: SHIPPED | OUTPATIENT
Start: 2018-10-08 | End: 2019-02-13 | Stop reason: SDUPTHER

## 2019-01-16 ENCOUNTER — TELEPHONE (OUTPATIENT)
Dept: CARDIOLOGY | Facility: CLINIC | Age: 69
End: 2019-01-16

## 2019-01-22 DIAGNOSIS — E55.9 VITAMIN D DEFICIENCY: ICD-10-CM

## 2019-01-22 DIAGNOSIS — E11.9 TYPE 2 DIABETES MELLITUS WITHOUT COMPLICATION, WITHOUT LONG-TERM CURRENT USE OF INSULIN (HCC): ICD-10-CM

## 2019-01-22 DIAGNOSIS — E78.5 HYPERLIPIDEMIA, UNSPECIFIED HYPERLIPIDEMIA TYPE: Primary | ICD-10-CM

## 2019-01-28 ENCOUNTER — RESULTS ENCOUNTER (OUTPATIENT)
Dept: ENDOCRINOLOGY | Age: 69
End: 2019-01-28

## 2019-01-28 DIAGNOSIS — E55.9 VITAMIN D DEFICIENCY: ICD-10-CM

## 2019-01-28 DIAGNOSIS — R53.82 CHRONIC FATIGUE: ICD-10-CM

## 2019-01-28 DIAGNOSIS — E66.9 CLASS 2 OBESITY WITHOUT SERIOUS COMORBIDITY WITH BODY MASS INDEX (BMI) OF 36.0 TO 36.9 IN ADULT, UNSPECIFIED OBESITY TYPE: ICD-10-CM

## 2019-01-28 DIAGNOSIS — E78.5 HYPERLIPIDEMIA, UNSPECIFIED HYPERLIPIDEMIA TYPE: ICD-10-CM

## 2019-01-28 DIAGNOSIS — R03.0 BORDERLINE SYSTOLIC HTN: ICD-10-CM

## 2019-01-28 DIAGNOSIS — E11.9 TYPE 2 DIABETES MELLITUS WITHOUT COMPLICATION, WITHOUT LONG-TERM CURRENT USE OF INSULIN (HCC): ICD-10-CM

## 2019-01-30 ENCOUNTER — LAB (OUTPATIENT)
Dept: ENDOCRINOLOGY | Age: 69
End: 2019-01-30

## 2019-01-30 DIAGNOSIS — E55.9 VITAMIN D DEFICIENCY: ICD-10-CM

## 2019-01-30 DIAGNOSIS — E78.5 HYPERLIPIDEMIA, UNSPECIFIED HYPERLIPIDEMIA TYPE: ICD-10-CM

## 2019-01-30 DIAGNOSIS — E11.9 TYPE 2 DIABETES MELLITUS WITHOUT COMPLICATION, WITHOUT LONG-TERM CURRENT USE OF INSULIN (HCC): ICD-10-CM

## 2019-01-31 LAB
25(OH)D3+25(OH)D2 SERPL-MCNC: 97.3 NG/ML (ref 30–100)
ALBUMIN SERPL-MCNC: 5 G/DL (ref 3.6–4.8)
ALBUMIN/GLOB SERPL: 2.1 {RATIO} (ref 1.2–2.2)
ALP SERPL-CCNC: 66 IU/L (ref 39–117)
ALT SERPL-CCNC: 20 IU/L (ref 0–32)
AST SERPL-CCNC: 21 IU/L (ref 0–40)
BILIRUB SERPL-MCNC: 0.5 MG/DL (ref 0–1.2)
BUN SERPL-MCNC: 16 MG/DL (ref 8–27)
BUN/CREAT SERPL: 23 (ref 12–28)
C PEPTIDE SERPL-MCNC: 4.8 NG/ML (ref 1.1–4.4)
CALCIUM SERPL-MCNC: 10.3 MG/DL (ref 8.7–10.3)
CHLORIDE SERPL-SCNC: 99 MMOL/L (ref 96–106)
CHOLEST SERPL-MCNC: 123 MG/DL (ref 100–199)
CO2 SERPL-SCNC: 25 MMOL/L (ref 20–29)
CREAT SERPL-MCNC: 0.69 MG/DL (ref 0.57–1)
GLOBULIN SER CALC-MCNC: 2.4 G/DL (ref 1.5–4.5)
GLUCOSE SERPL-MCNC: 92 MG/DL (ref 65–99)
HBA1C MFR BLD: 5.1 % (ref 4.8–5.6)
HDLC SERPL-MCNC: 46 MG/DL
INTERPRETATION: NORMAL
LDLC SERPL CALC-MCNC: 53 MG/DL (ref 0–99)
Lab: NORMAL
POTASSIUM SERPL-SCNC: 4 MMOL/L (ref 3.5–5.2)
PROT SERPL-MCNC: 7.4 G/DL (ref 6–8.5)
SODIUM SERPL-SCNC: 139 MMOL/L (ref 134–144)
TRIGL SERPL-MCNC: 122 MG/DL (ref 0–149)
VLDLC SERPL CALC-MCNC: 24 MG/DL (ref 5–40)

## 2019-02-13 ENCOUNTER — OFFICE VISIT (OUTPATIENT)
Dept: ENDOCRINOLOGY | Age: 69
End: 2019-02-13

## 2019-02-13 VITALS
DIASTOLIC BLOOD PRESSURE: 82 MMHG | RESPIRATION RATE: 16 BRPM | BODY MASS INDEX: 30.42 KG/M2 | WEIGHT: 193.8 LBS | HEIGHT: 67 IN | SYSTOLIC BLOOD PRESSURE: 122 MMHG

## 2019-02-13 DIAGNOSIS — E11.9 TYPE 2 DIABETES MELLITUS WITHOUT COMPLICATION, WITHOUT LONG-TERM CURRENT USE OF INSULIN (HCC): Primary | ICD-10-CM

## 2019-02-13 DIAGNOSIS — E78.5 HYPERLIPIDEMIA, UNSPECIFIED HYPERLIPIDEMIA TYPE: ICD-10-CM

## 2019-02-13 DIAGNOSIS — E55.9 VITAMIN D DEFICIENCY: ICD-10-CM

## 2019-02-13 DIAGNOSIS — R03.0 BORDERLINE SYSTOLIC HTN: ICD-10-CM

## 2019-02-13 PROCEDURE — 99214 OFFICE O/P EST MOD 30 MIN: CPT | Performed by: NURSE PRACTITIONER

## 2019-02-13 RX ORDER — ERGOCALCIFEROL 1.25 MG/1
50000 CAPSULE ORAL WEEKLY
Qty: 12 CAPSULE | Refills: 1
Start: 2019-02-13 | End: 2019-09-16 | Stop reason: SDUPTHER

## 2019-02-13 NOTE — PROGRESS NOTES
"Santiago Dunlap is a 68 y.o. female is here today for follow-up.  Chief Complaint   Patient presents with   • Diabetes     lab review; checking BG 2 times a day; constipation   • Hyperlipidemia     /82   Resp 16   Ht 170.2 cm (67\")   Wt 87.9 kg (193 lb 12.8 oz)   BMI 30.35 kg/m²   Current Outpatient Medications on File Prior to Visit   Medication Sig   • BYDUREON BCISE 2 MG/0.85ML auto-injector injection Inject 0.85 mL under the skin into the appropriate area as directed 1 (One) Time Per Week.   • digoxin (LANOXIN) 250 MCG tablet TAKE ONE TABLET BY MOUTH DAILY   • ergocalciferol (DRISDOL) 51042 units capsule Take 1 capsule by mouth 2 (Two) Times a Week.   • Lancets (ONETOUCH ULTRASOFT) lancets Check blood glucose twice daily   • Multiple Vitamins-Minerals (MULTIVITAMIN WOMEN 50+) tablet Take  by mouth.   • multivitamins-minerals (PRESERVISION AREDS 2) capsule capsule Take 1 capsule by mouth 2 (Two) Times a Day.   • ONETOUCH VERIO test strip Check blood glucose twice daily Use as instructed   • rosuvastatin (CRESTOR) 20 MG tablet Take 1 tablet by mouth Every Night.   • SYNJARDY XR 12.5-1000 MG tablet sustained-release 24 hour Take 2 tablets by mouth Daily.   • travoprost, YAHAIRA free, (TRAVATAN) 0.004 % solution ophthalmic solution 1 drop every evening. in affected eye(s)   • [DISCONTINUED] Multiple Vitamins-Minerals (OCUVITE ADULT 50+) capsule Take  by mouth.     No current facility-administered medications on file prior to visit.      Family History   Problem Relation Age of Onset   • Heart attack Mother    • Glaucoma Mother    • Arrhythmia Father    • Angina Father    • Glaucoma Sister    • Breast cancer Sister    • Colon cancer Brother      Social History     Tobacco Use   • Smoking status: Never Smoker   • Smokeless tobacco: Never Used   Substance Use Topics   • Alcohol use: No   • Drug use: No     Allergies   Allergen Reactions   • Erythromycin    • Erythromycin Base    • Penicillins  "         Diabetes   She has type 2 diabetes mellitus. No MedicAlert identification noted. The initial diagnosis of diabetes was made 2 years ago. Pertinent negatives for hypoglycemia include no confusion, dizziness, hunger, mood changes, pallor, seizures, speech difficulty or sweats. Associated symptoms include blurred vision, foot paresthesias and weight loss. Pertinent negatives for diabetes include no chest pain, no fatigue, no foot ulcerations, no polyphagia, no polyuria and no weakness. Pertinent negatives for hypoglycemia complications include no blackouts, no hospitalization, no required assistance and no required glucagon injection. Symptoms are stable. Pertinent negatives for diabetic complications include no CVA, heart disease, impotence, nephropathy, peripheral neuropathy, PVD or retinopathy. Risk factors for coronary artery disease include dyslipidemia, family history, obesity and sedentary lifestyle. Current diabetic treatment includes diet and oral agent (dual therapy). She is compliant with treatment all of the time. Her weight is decreasing steadily. She is following a generally healthy and low fat/cholesterol diet. Meal planning includes avoidance of concentrated sweets and carbohydrate counting. She has not had a previous visit with a dietitian. She rarely participates in exercise. She monitors blood glucose at home 1-2 x per day. Blood glucose monitoring compliance is excellent. Her home blood glucose trend is decreasing steadily. Her breakfast blood glucose is taken between 8-9 am. Her breakfast blood glucose range is generally  mg/dl. Her dinner blood glucose is taken after 8 pm. Her dinner blood glucose range is generally  mg/dl. She does not see a podiatrist.Eye exam is current.     Encounter Diagnoses   Name Primary?   • Borderline systolic HTN    • Hyperlipidemia, unspecified hyperlipidemia type    • Vitamin D deficiency    • Type 2 diabetes mellitus without complication, without  long-term current use of insulin (CMS/[facility]) Yes   68-year-old female patient here today for routine follow-up visit.  She is being seen for the above-mentioned diagnosis.  She has been dieting for weight loss and has lost approximately 32 pounds since her last visit.  She is checking her blood sugars twice daily and may have significantly improved.  Her highest blood sugar readings according to her download his been 126.  She had recent labs which were reviewed and she was provided a copy.  She denies any problems with her current medications and she denies having any yeast infection.  She does have problems with peripheral neuropathy but not enough to impact her sleep.  She had questions regarding her medications and she was educated regarding mechanism of action of her current regimen.        The following portions of the patient's history were reviewed and updated as appropriate: allergies, current medications, past family history, past medical history, past social history, past surgical history and problem list.    Review of Systems   Constitutional: Positive for weight loss. Negative for fatigue.   Eyes: Positive for blurred vision.   Cardiovascular: Negative for chest pain.   Gastrointestinal: Positive for constipation.   Endocrine: Negative for polyphagia and polyuria.   Genitourinary: Negative for impotence.   Skin: Negative for pallor.   Neurological: Negative for dizziness, seizures, speech difficulty and weakness.   Psychiatric/Behavioral: Negative for confusion.       Objective   Physical Exam   Constitutional: She is oriented to person, place, and time. She appears well-developed and well-nourished. No distress.   HENT:   Head: Normocephalic and atraumatic.   Right Ear: External ear normal.   Left Ear: External ear normal.   Nose: Nose normal.   Mouth/Throat: Oropharynx is clear and moist. No oropharyngeal exudate.   Eyes: EOM are normal. Pupils are equal, round, and reactive to light. Right eye exhibits  no discharge. Left eye exhibits no discharge.   Neck: Trachea normal, normal range of motion and full passive range of motion without pain. Neck supple. No tracheal tenderness present. Carotid bruit is not present. No tracheal deviation, no edema and no erythema present. No thyroid mass and no thyromegaly present.   Cardiovascular: Normal rate, regular rhythm, normal heart sounds and intact distal pulses. Exam reveals no gallop and no friction rub.   No murmur heard.  Pulmonary/Chest: Effort normal and breath sounds normal. No stridor. No respiratory distress. She has no wheezes. She has no rales.   Abdominal: Soft. Bowel sounds are normal. She exhibits no distension.   Musculoskeletal: Normal range of motion. She exhibits no edema or deformity.    Diabetic foot exam performed: normal exam.   During the foot exam she had a monofilament test not performed.  Lymphadenopathy:     She has no cervical adenopathy.   Neurological: She is alert and oriented to person, place, and time.   Skin: Skin is warm and dry. No rash noted. She is not diaphoretic. No erythema. No pallor.   Psychiatric: She has a normal mood and affect. Her behavior is normal. Judgment and thought content normal.   Nursing note and vitals reviewed.    Results for orders placed or performed in visit on 01/30/19   Hemoglobin A1c   Result Value Ref Range    Hemoglobin A1C 5.1 4.8 - 5.6 %   Vitamin D 25 Hydroxy   Result Value Ref Range    25 Hydroxy, Vitamin D 97.3 30.0 - 100.0 ng/mL   Lipid Panel   Result Value Ref Range    Total Cholesterol 123 100 - 199 mg/dL    Triglycerides 122 0 - 149 mg/dL    HDL Cholesterol 46 >39 mg/dL    VLDL Cholesterol 24 5 - 40 mg/dL    LDL Cholesterol  53 0 - 99 mg/dL   Comprehensive Metabolic Panel   Result Value Ref Range    Glucose 92 65 - 99 mg/dL    BUN 16 8 - 27 mg/dL    Creatinine 0.69 0.57 - 1.00 mg/dL    eGFR Non African Am 90 >59 mL/min/1.73    eGFR African Am 104 >59 mL/min/1.73    BUN/Creatinine Ratio 23 12 - 28     Sodium 139 134 - 144 mmol/L    Potassium 4.0 3.5 - 5.2 mmol/L    Chloride 99 96 - 106 mmol/L    Total CO2 25 20 - 29 mmol/L    Calcium 10.3 8.7 - 10.3 mg/dL    Total Protein 7.4 6.0 - 8.5 g/dL    Albumin 5.0 (H) 3.6 - 4.8 g/dL    Globulin 2.4 1.5 - 4.5 g/dL    A/G Ratio 2.1 1.2 - 2.2    Total Bilirubin 0.5 0.0 - 1.2 mg/dL    Alkaline Phosphatase 66 39 - 117 IU/L    AST (SGOT) 21 0 - 40 IU/L    ALT (SGPT) 20 0 - 32 IU/L   C-Peptide   Result Value Ref Range    C-Peptide 4.8 (H) 1.1 - 4.4 ng/mL   Cardiovascular Risk Assessment   Result Value Ref Range    Interpretation Note    Diabetes Patient Education   Result Value Ref Range    PDF Image Not applicable          Assessment/Plan   Problems Addressed this Visit        Cardiovascular and Mediastinum    Hyperlipidemia    Borderline systolic HTN       Digestive    Vitamin D deficiency       Endocrine    Type 2 diabetes mellitus without complication, without long-term current use of insulin (CMS/[facility]) - Primary        In summary, patient was seen and examined.  Metabolically she has improved significantly and her hemoglobin A1c is now at goal of less than 7.  She is tolerating her medications that were prescribed by Dr. Vail her last visit.  She's had no complications and feels significantly improved.  She will follow-up with Dr. Vail in 6 months with labs prior.  She will continue all her current medications as prescribed with the exception of vitamin D.  Based on her last level I am going to decrease her vitamin D due to 50,000 units once weekly.  Blood pressure is an satisfactory range and cholesterol is improved significantly since her last visit.

## 2019-02-26 RX ORDER — EXENATIDE 2 MG/.85ML
INJECTION, SUSPENSION, EXTENDED RELEASE SUBCUTANEOUS
Qty: 12 PEN | Refills: 0 | Status: SHIPPED | OUTPATIENT
Start: 2019-02-26 | End: 2019-03-21 | Stop reason: SDUPTHER

## 2019-03-21 RX ORDER — EXENATIDE 2 MG/.85ML
INJECTION, SUSPENSION, EXTENDED RELEASE SUBCUTANEOUS
Qty: 4 PEN | Refills: 0 | Status: SHIPPED | OUTPATIENT
Start: 2019-03-21 | End: 2019-08-26 | Stop reason: SDUPTHER

## 2019-04-03 RX ORDER — DIGOXIN 250 MCG
250 TABLET ORAL DAILY
Qty: 30 TABLET | Refills: 5 | Status: SHIPPED | OUTPATIENT
Start: 2019-04-03 | End: 2019-10-15 | Stop reason: SDUPTHER

## 2019-04-22 RX ORDER — EMPAGLIFLOZIN, METFORMIN HYDROCHLORIDE 12.5; 1 MG/1; MG/1
TABLET, EXTENDED RELEASE ORAL
Qty: 60 TABLET | Refills: 3 | Status: SHIPPED | OUTPATIENT
Start: 2019-04-22 | End: 2019-08-13 | Stop reason: SDUPTHER

## 2019-06-04 RX ORDER — EXENATIDE 2 MG/.85ML
INJECTION, SUSPENSION, EXTENDED RELEASE SUBCUTANEOUS
Qty: 4 PEN | Refills: 0 | Status: SHIPPED | OUTPATIENT
Start: 2019-06-04 | End: 2019-06-29 | Stop reason: SDUPTHER

## 2019-07-01 RX ORDER — EXENATIDE 2 MG/.85ML
INJECTION, SUSPENSION, EXTENDED RELEASE SUBCUTANEOUS
Qty: 4 PEN | Refills: 0 | Status: SHIPPED | OUTPATIENT
Start: 2019-07-01 | End: 2019-09-16 | Stop reason: SDUPTHER

## 2019-07-23 ENCOUNTER — HOSPITAL ENCOUNTER (OUTPATIENT)
Dept: CARDIOLOGY | Facility: HOSPITAL | Age: 69
Discharge: HOME | End: 2019-07-23
Admitting: INTERNAL MEDICINE

## 2019-07-23 DIAGNOSIS — R03.0 BORDERLINE SYSTOLIC HTN: ICD-10-CM

## 2019-07-23 DIAGNOSIS — I47.1 SUPRAVENTRICULAR TACHYCARDIA (HCC): ICD-10-CM

## 2019-07-23 DIAGNOSIS — E78.5 DYSLIPIDEMIA: ICD-10-CM

## 2019-07-23 DIAGNOSIS — R89.9 ABNORMAL LABORATORY TEST: ICD-10-CM

## 2019-07-23 LAB
ALBUMIN SERPL-MCNC: 4.8 G/DL (ref 3.5–5.2)
ALBUMIN/GLOB SERPL: 1.8 G/DL
ALP SERPL-CCNC: 65 U/L (ref 39–117)
ALT SERPL W P-5'-P-CCNC: 21 U/L (ref 1–33)
ANION GAP SERPL CALCULATED.3IONS-SCNC: 11.8 MMOL/L (ref 5–15)
AST SERPL-CCNC: 17 U/L (ref 1–32)
BILIRUB SERPL-MCNC: 0.5 MG/DL (ref 0.2–1.2)
BUN BLD-MCNC: 18 MG/DL (ref 8–23)
BUN/CREAT SERPL: 28.1 (ref 7–25)
CALCIUM SPEC-SCNC: 10.4 MG/DL (ref 8.6–10.5)
CHLORIDE SERPL-SCNC: 100 MMOL/L (ref 98–107)
CHOLEST SERPL-MCNC: 131 MG/DL (ref 0–200)
CO2 SERPL-SCNC: 27.2 MMOL/L (ref 22–29)
CREAT BLD-MCNC: 0.64 MG/DL (ref 0.57–1)
GFR SERPL CREATININE-BSD FRML MDRD: 92 ML/MIN/1.73
GLOBULIN UR ELPH-MCNC: 2.7 GM/DL
GLUCOSE BLD-MCNC: 91 MG/DL (ref 65–99)
HDLC SERPL-MCNC: 47 MG/DL (ref 40–60)
LDLC SERPL CALC-MCNC: 53 MG/DL (ref 0–100)
LDLC/HDLC SERPL: 1.12 {RATIO}
POTASSIUM BLD-SCNC: 4.6 MMOL/L (ref 3.5–5.2)
PROT SERPL-MCNC: 7.5 G/DL (ref 6–8.5)
SODIUM BLD-SCNC: 139 MMOL/L (ref 136–145)
TRIGL SERPL-MCNC: 156 MG/DL (ref 0–150)
VLDLC SERPL-MCNC: 31.2 MG/DL (ref 5–40)

## 2019-07-23 PROCEDURE — 80061 LIPID PANEL: CPT | Performed by: INTERNAL MEDICINE

## 2019-07-23 PROCEDURE — 80053 COMPREHEN METABOLIC PANEL: CPT | Performed by: INTERNAL MEDICINE

## 2019-07-23 PROCEDURE — 36415 COLL VENOUS BLD VENIPUNCTURE: CPT | Performed by: INTERNAL MEDICINE

## 2019-07-29 ENCOUNTER — OFFICE VISIT (OUTPATIENT)
Dept: CARDIOLOGY | Facility: CLINIC | Age: 69
End: 2019-07-29

## 2019-07-29 VITALS
SYSTOLIC BLOOD PRESSURE: 133 MMHG | WEIGHT: 191 LBS | HEART RATE: 60 BPM | BODY MASS INDEX: 29.98 KG/M2 | HEIGHT: 67 IN | DIASTOLIC BLOOD PRESSURE: 78 MMHG

## 2019-07-29 DIAGNOSIS — E78.5 HYPERLIPIDEMIA, UNSPECIFIED HYPERLIPIDEMIA TYPE: ICD-10-CM

## 2019-07-29 DIAGNOSIS — R03.0 BORDERLINE SYSTOLIC HTN: Primary | ICD-10-CM

## 2019-07-29 PROCEDURE — 99212 OFFICE O/P EST SF 10 MIN: CPT | Performed by: INTERNAL MEDICINE

## 2019-07-29 PROCEDURE — 93000 ELECTROCARDIOGRAM COMPLETE: CPT | Performed by: INTERNAL MEDICINE

## 2019-08-13 RX ORDER — EMPAGLIFLOZIN, METFORMIN HYDROCHLORIDE 12.5; 1 MG/1; MG/1
TABLET, EXTENDED RELEASE ORAL
Qty: 60 TABLET | Refills: 0 | Status: SHIPPED | OUTPATIENT
Start: 2019-08-13 | End: 2019-09-16 | Stop reason: SDUPTHER

## 2019-08-26 RX ORDER — EXENATIDE 2 MG/.85ML
INJECTION, SUSPENSION, EXTENDED RELEASE SUBCUTANEOUS
Qty: 4 PEN | Refills: 0 | Status: SHIPPED | OUTPATIENT
Start: 2019-08-26 | End: 2019-09-16 | Stop reason: SDUPTHER

## 2019-09-04 LAB
25(OH)D3+25(OH)D2 SERPL-MCNC: 53.5 NG/ML (ref 30–100)
ALBUMIN SERPL-MCNC: 5.1 G/DL (ref 3.5–5.2)
ALBUMIN/GLOB SERPL: 2.3 G/DL
ALP SERPL-CCNC: 64 U/L (ref 39–117)
ALT SERPL-CCNC: 23 U/L (ref 1–33)
AST SERPL-CCNC: 21 U/L (ref 1–32)
BILIRUB SERPL-MCNC: 0.6 MG/DL (ref 0.2–1.2)
BUN SERPL-MCNC: 12 MG/DL (ref 8–23)
BUN/CREAT SERPL: 18.8 (ref 7–25)
C PEPTIDE SERPL-MCNC: 3.8 NG/ML (ref 1.1–4.4)
CALCIUM SERPL-MCNC: 10.2 MG/DL (ref 8.6–10.5)
CHLORIDE SERPL-SCNC: 100 MMOL/L (ref 98–107)
CHOLEST SERPL-MCNC: 132 MG/DL (ref 100–199)
CO2 SERPL-SCNC: 27.6 MMOL/L (ref 22–29)
CREAT SERPL-MCNC: 0.64 MG/DL (ref 0.57–1)
GLOBULIN SER CALC-MCNC: 2.2 GM/DL
GLUCOSE SERPL-MCNC: 90 MG/DL (ref 65–99)
HBA1C MFR BLD: 5.1 % (ref 4.8–5.6)
HDL SERPL-SCNC: 43.7 UMOL/L
HDLC SERPL-MCNC: 55 MG/DL
LDL SERPL QN: 20.2 NM
LDL SERPL-SCNC: 858 NMOL/L
LDL SMALL SERPL-SCNC: 584 NMOL/L
LDLC SERPL CALC-MCNC: 53 MG/DL (ref 0–99)
MICROALBUMIN UR-MCNC: 33.2 UG/ML
POTASSIUM SERPL-SCNC: 4.4 MMOL/L (ref 3.5–5.2)
PROT SERPL-MCNC: 7.3 G/DL (ref 6–8.5)
SODIUM SERPL-SCNC: 142 MMOL/L (ref 136–145)
T3FREE SERPL-MCNC: 2.5 PG/ML (ref 2–4.4)
T4 FREE SERPL-MCNC: 1.38 NG/DL (ref 0.93–1.7)
T4 SERPL-MCNC: 8.75 MCG/DL (ref 4.5–11.7)
THYROGLOB AB SERPL-ACNC: <1 IU/ML (ref 0–0.9)
THYROGLOB SERPL-MCNC: 10 NG/ML (ref 1.5–38.5)
TRIGL SERPL-MCNC: 118 MG/DL (ref 0–149)
TSH SERPL DL<=0.005 MIU/L-ACNC: 1.59 UIU/ML (ref 0.27–4.2)
URATE SERPL-MCNC: 3.9 MG/DL (ref 2.4–5.7)

## 2019-09-16 ENCOUNTER — OFFICE VISIT (OUTPATIENT)
Dept: ENDOCRINOLOGY | Age: 69
End: 2019-09-16

## 2019-09-16 VITALS
RESPIRATION RATE: 16 BRPM | SYSTOLIC BLOOD PRESSURE: 122 MMHG | DIASTOLIC BLOOD PRESSURE: 76 MMHG | BODY MASS INDEX: 29.03 KG/M2 | WEIGHT: 185 LBS | HEIGHT: 67 IN

## 2019-09-16 DIAGNOSIS — E11.9 TYPE 2 DIABETES MELLITUS WITHOUT COMPLICATION, WITHOUT LONG-TERM CURRENT USE OF INSULIN (HCC): Primary | ICD-10-CM

## 2019-09-16 DIAGNOSIS — E78.5 HYPERLIPIDEMIA, UNSPECIFIED HYPERLIPIDEMIA TYPE: ICD-10-CM

## 2019-09-16 DIAGNOSIS — R53.82 CHRONIC FATIGUE: ICD-10-CM

## 2019-09-16 DIAGNOSIS — E55.9 VITAMIN D DEFICIENCY: ICD-10-CM

## 2019-09-16 PROCEDURE — 99211 OFF/OP EST MAY X REQ PHY/QHP: CPT | Performed by: INTERNAL MEDICINE

## 2019-09-16 RX ORDER — EMPAGLIFLOZIN, METFORMIN HYDROCHLORIDE 12.5; 1 MG/1; MG/1
2 TABLET, EXTENDED RELEASE ORAL DAILY
Qty: 180 TABLET | Refills: 3 | Status: SHIPPED | OUTPATIENT
Start: 2019-09-16 | End: 2019-09-17 | Stop reason: SDUPTHER

## 2019-09-16 RX ORDER — BLOOD SUGAR DIAGNOSTIC
STRIP MISCELLANEOUS
Qty: 60 EACH | Refills: 11 | Status: SHIPPED | OUTPATIENT
Start: 2019-09-16 | End: 2019-09-17 | Stop reason: SDUPTHER

## 2019-09-16 RX ORDER — ROSUVASTATIN CALCIUM 20 MG/1
20 TABLET, COATED ORAL NIGHTLY
Qty: 90 TABLET | Refills: 3 | Status: SHIPPED | OUTPATIENT
Start: 2019-09-16 | End: 2019-09-17 | Stop reason: SDUPTHER

## 2019-09-16 RX ORDER — EXENATIDE 2 MG/.85ML
INJECTION, SUSPENSION, EXTENDED RELEASE SUBCUTANEOUS
Qty: 12 PEN | Refills: 3 | Status: SHIPPED | OUTPATIENT
Start: 2019-09-16 | End: 2019-09-17 | Stop reason: SDUPTHER

## 2019-09-16 RX ORDER — ERGOCALCIFEROL 1.25 MG/1
50000 CAPSULE ORAL WEEKLY
Qty: 13 CAPSULE | Refills: 3
Start: 2019-09-16 | End: 2019-09-17 | Stop reason: SDUPTHER

## 2019-09-16 NOTE — PROGRESS NOTES
"Subjective   Delaney Dunlap is a 69 y.o. female seen for follow up for DM2, hyperlipidemia, lab review. Patient is checking BG 2 times a day. She denies any problems or concerns.     History of Present Illness this is a 69-year-old female known patient with type 2 diabetes, borderline hypertension and dyslipidemia as well as vitamin D deficiency.  Over the course of last 6 months she has had no significant health problem for which to go to the ER or hospital.    /76   Resp 16   Ht 170.2 cm (67\")   Wt 83.9 kg (185 lb)   BMI 28.98 kg/m²      Allergies   Allergen Reactions   • Erythromycin    • Erythromycin Base    • Penicillins        Current Outpatient Medications:   •  BYDUREON BCISE 2 MG/0.85ML auto-injector injection, INJECT 0.85ML UNDER THE SKIN EVERY WEEK, Disp: 4 pen, Rfl: 0  •  digoxin (LANOXIN) 250 MCG tablet, Take 1 tablet by mouth Daily., Disp: 30 tablet, Rfl: 5  •  ergocalciferol (DRISDOL) 29560 units capsule, Take 1 capsule by mouth 1 (One) Time Per Week., Disp: 12 capsule, Rfl: 1  •  Multiple Vitamins-Minerals (MULTIVITAMIN WOMEN 50+) tablet, Take  by mouth., Disp: , Rfl:   •  multivitamins-minerals (PRESERVISION AREDS 2) capsule capsule, Take 1 capsule by mouth 2 (Two) Times a Day., Disp: , Rfl:   •  ONETOUCH DELICA LANCETS FINE misc, USE AS DIRECTED TWICE DAILY, Disp: 100 each, Rfl: 0  •  ONETOUCH VERIO test strip, Check blood glucose twice daily Use as instructed, Disp: 60 each, Rfl: 11  •  rosuvastatin (CRESTOR) 20 MG tablet, Take 1 tablet by mouth Every Night., Disp: 90 tablet, Rfl: 3  •  SYNJARDY XR 12.5-1000 MG tablet sustained-release 24 hour, TAKE 2 TABLETS BY MOUTH EVERY DAY, Disp: 60 tablet, Rfl: 0  •  travoprost, YAHAIRA free, (TRAVATAN) 0.004 % solution ophthalmic solution, 1 drop every evening. in affected eye(s), Disp: , Rfl:     The following portions of the patient's history were reviewed and updated as appropriate: allergies, current medications, past family history, past " medical history, past social history, past surgical history and problem list.    Review of Systems   Constitutional: Negative.    HENT: Negative.    Eyes: Negative.    Respiratory: Negative.    Cardiovascular: Negative.  Negative for chest pain.   Gastrointestinal: Negative.    Endocrine: Negative.  Negative for polyphagia and polyuria.   Genitourinary: Negative.    Musculoskeletal: Negative.    Skin: Negative.  Negative for pallor.   Allergic/Immunologic: Negative.    Neurological: Negative.  Negative for dizziness, tremors, seizures, speech difficulty, weakness and headaches.   Hematological: Negative.    Psychiatric/Behavioral: Negative.  Negative for confusion. The patient is not nervous/anxious.        Objective   Physical Exam   Constitutional: She is oriented to person, place, and time. She appears well-developed and well-nourished. No distress.   HENT:   Head: Normocephalic and atraumatic.   Right Ear: External ear normal.   Left Ear: External ear normal.   Nose: Nose normal.   Mouth/Throat: Oropharynx is clear and moist. No oropharyngeal exudate.   Eyes: Conjunctivae and EOM are normal. Pupils are equal, round, and reactive to light. Right eye exhibits no discharge. Left eye exhibits no discharge. No scleral icterus.   Neck: Normal range of motion. Neck supple. No JVD present. No tracheal deviation present. No thyromegaly present.   Cardiovascular: Normal rate, regular rhythm, normal heart sounds and intact distal pulses. Exam reveals no gallop and no friction rub.   No murmur heard.  Pulmonary/Chest: Effort normal and breath sounds normal. No stridor. No respiratory distress. She has no wheezes. She has no rales. She exhibits no tenderness.   Abdominal: Soft. Bowel sounds are normal. She exhibits no distension and no mass. There is no tenderness. There is no rebound and no guarding. No hernia.   Musculoskeletal: Normal range of motion. She exhibits no edema, tenderness or deformity.   Lymphadenopathy:      She has no cervical adenopathy.   Neurological: She is alert and oriented to person, place, and time. She has normal reflexes. She displays normal reflexes. No cranial nerve deficit or sensory deficit. She exhibits normal muscle tone. Coordination normal.   Skin: Skin is warm and dry. No rash noted. She is not diaphoretic. No erythema. No pallor.   Diffuse vitiligo.   Psychiatric: She has a normal mood and affect. Her behavior is normal. Judgment and thought content normal.   Nursing note and vitals reviewed.       Lab Results   Component Value Date    GLUCOSE 91 07/23/2019    BUN 12 09/03/2019    CREATININE 0.64 09/03/2019    EGFRIFNONA 92 09/03/2019    EGFRIFAFRI 112 09/03/2019    BCR 18.8 09/03/2019    K 4.4 09/03/2019    CO2 27.6 09/03/2019    CALCIUM 10.2 09/03/2019    PROTENTOTREF 7.3 09/03/2019    ALBUMIN 5.10 09/03/2019    LABIL2 2.3 09/03/2019    AST 21 09/03/2019    ALT 23 09/03/2019     Lab Results   Component Value Date    HGBA1C 5.10 09/03/2019     Lab Results   Component Value Date    TSH 1.590 09/03/2019     Lab Results   Component Value Date    CHOL 131 07/23/2019    CHOL 178 07/20/2018    CHOL 176 07/28/2017    CHLPL 132 09/03/2019    CHLPL 123 01/30/2019    CHLPL 182 10/04/2018     Lab Results   Component Value Date    TRIG 118 09/03/2019    TRIG 156 (H) 07/23/2019    TRIG 122 01/30/2019     Lab Results   Component Value Date    HDL 47 07/23/2019    HDL 46 01/30/2019    HDL 40 07/20/2018     Lab Results   Component Value Date    LDL 53 07/23/2019    LDL 53 01/30/2019    LDL 90 07/20/2018         Assessment/Plan   Diagnoses and all orders for this visit:    Type 2 diabetes mellitus without complication, without long-term current use of insulin (CMS/[facility])  -     T4 & TSH ([facility]); Future  -     Uric Acid; Future  -     Vitamin D 25 Hydroxy; Future  -     Comprehensive Metabolic Panel; Future  -     C-Peptide; Future  -     Hemoglobin A1c; Future  -     MicroAlbumin, Urine, Random - Urine, Clean  Catch; Future  -     NMR LipoProfile; Future    Vitamin D deficiency  -     T4 & TSH ([facility]); Future  -     Uric Acid; Future  -     Vitamin D 25 Hydroxy; Future  -     Comprehensive Metabolic Panel; Future  -     C-Peptide; Future  -     Hemoglobin A1c; Future  -     MicroAlbumin, Urine, Random - Urine, Clean Catch; Future  -     NMR LipoProfile; Future    Hyperlipidemia, unspecified hyperlipidemia type  -     T4 & TSH ([facility]); Future  -     Uric Acid; Future  -     Vitamin D 25 Hydroxy; Future  -     Comprehensive Metabolic Panel; Future  -     C-Peptide; Future  -     Hemoglobin A1c; Future  -     MicroAlbumin, Urine, Random - Urine, Clean Catch; Future  -     NMR LipoProfile; Future    Chronic fatigue  -     T4 & TSH ([facility]); Future  -     Uric Acid; Future  -     Vitamin D 25 Hydroxy; Future  -     Comprehensive Metabolic Panel; Future  -     C-Peptide; Future  -     Hemoglobin A1c; Future  -     MicroAlbumin, Urine, Random - Urine, Clean Catch; Future  -     NMR LipoProfile; Future    Other orders  -     Discontinue: SYNJARDY XR 12.5-1000 MG tablet sustained-release 24 hour; Take 2 tablets by mouth Daily.  -     Discontinue: rosuvastatin (CRESTOR) 20 MG tablet; Take 1 tablet by mouth Every Night.  -     Discontinue: ergocalciferol (DRISDOL) 63878 units capsule; Take 1 capsule by mouth 1 (One) Time Per Week.  -     Discontinue: BYDUREON BCISE 2 MG/0.85ML auto-injector injection; 1 ampoule once a week.  -     Discontinue: ONETOUCH VERIO test strip; Check blood glucose twice daily Use as instructed  -     Discontinue: ONETOUCH DELICA LANCETS FINE misc; USE AS DIRECTED TWICE DAILY      Is summary I saw and examined this 69-year-old female for above-mentioned problems.  I reviewed her laboratory evaluation of 9/3/2019 and provided her with a hard copy of it.  Overall she is clinically and metabolically stable and therefore we will go ahead and continue her current prescriptions.  She will see Ms. Marixa  Zain in 6 months or sooner if needed with laboratory evaluation prior to each office visit.

## 2019-09-17 ENCOUNTER — TELEPHONE (OUTPATIENT)
Dept: ENDOCRINOLOGY | Age: 69
End: 2019-09-17

## 2019-09-17 RX ORDER — ERGOCALCIFEROL 1.25 MG/1
50000 CAPSULE ORAL WEEKLY
Qty: 13 CAPSULE | Refills: 3 | Status: SHIPPED | OUTPATIENT
Start: 2019-09-17 | End: 2020-09-16

## 2019-09-17 RX ORDER — EMPAGLIFLOZIN, METFORMIN HYDROCHLORIDE 12.5; 1 MG/1; MG/1
2 TABLET, EXTENDED RELEASE ORAL DAILY
Qty: 180 TABLET | Refills: 3 | Status: SHIPPED | OUTPATIENT
Start: 2019-09-17 | End: 2020-10-16 | Stop reason: SDUPTHER

## 2019-09-17 RX ORDER — ROSUVASTATIN CALCIUM 20 MG/1
20 TABLET, COATED ORAL NIGHTLY
Qty: 90 TABLET | Refills: 3 | Status: SHIPPED | OUTPATIENT
Start: 2019-09-17 | End: 2020-08-31

## 2019-09-17 RX ORDER — BLOOD SUGAR DIAGNOSTIC
STRIP MISCELLANEOUS
Qty: 200 EACH | Refills: 3 | Status: SHIPPED | OUTPATIENT
Start: 2019-09-17 | End: 2020-12-16

## 2019-09-17 RX ORDER — EXENATIDE 2 MG/.85ML
INJECTION, SUSPENSION, EXTENDED RELEASE SUBCUTANEOUS
Qty: 12 PEN | Refills: 3 | Status: SHIPPED | OUTPATIENT
Start: 2019-09-17 | End: 2020-03-16

## 2019-09-17 RX ORDER — EMPAGLIFLOZIN, METFORMIN HYDROCHLORIDE 12.5; 1 MG/1; MG/1
TABLET, EXTENDED RELEASE ORAL
Qty: 60 TABLET | Refills: 0 | Status: SHIPPED | OUTPATIENT
Start: 2019-09-17 | End: 2019-09-17 | Stop reason: SDUPTHER

## 2019-09-17 NOTE — TELEPHONE ENCOUNTER
All medications need to go to [facility]  The ones sent yesterday during her office visit was sent to [facility]  Can you please change and send to [facility]    Medication change how she takes bydureon but doesn't say how   Please call patient and explain the change  Please caLL

## 2019-09-20 PROBLEM — E66.812 CLASS 2 OBESITY WITHOUT SERIOUS COMORBIDITY WITH BODY MASS INDEX (BMI) OF 36.0 TO 36.9 IN ADULT: Status: RESOLVED | Noted: 2018-10-04 | Resolved: 2019-09-20

## 2019-09-20 PROBLEM — E66.9 CLASS 2 OBESITY WITHOUT SERIOUS COMORBIDITY WITH BODY MASS INDEX (BMI) OF 36.0 TO 36.9 IN ADULT: Status: RESOLVED | Noted: 2018-10-04 | Resolved: 2019-09-20

## 2019-10-15 RX ORDER — DIGOXIN 250 UG/1
TABLET ORAL
Qty: 30 TABLET | Refills: 4 | Status: SHIPPED | OUTPATIENT
Start: 2019-10-15 | End: 2020-03-11 | Stop reason: SDUPTHER

## 2019-10-15 RX ORDER — EMPAGLIFLOZIN, METFORMIN HYDROCHLORIDE 12.5; 1 MG/1; MG/1
TABLET, EXTENDED RELEASE ORAL
Qty: 60 TABLET | Refills: 0 | OUTPATIENT
Start: 2019-10-15

## 2020-03-02 ENCOUNTER — RESULTS ENCOUNTER (OUTPATIENT)
Dept: ENDOCRINOLOGY | Age: 70
End: 2020-03-02

## 2020-03-02 DIAGNOSIS — E11.9 TYPE 2 DIABETES MELLITUS WITHOUT COMPLICATION, WITHOUT LONG-TERM CURRENT USE OF INSULIN (HCC): ICD-10-CM

## 2020-03-02 DIAGNOSIS — E78.5 HYPERLIPIDEMIA, UNSPECIFIED HYPERLIPIDEMIA TYPE: ICD-10-CM

## 2020-03-02 DIAGNOSIS — E55.9 VITAMIN D DEFICIENCY: ICD-10-CM

## 2020-03-02 DIAGNOSIS — R53.82 CHRONIC FATIGUE: ICD-10-CM

## 2020-03-03 LAB
25(OH)D3+25(OH)D2 SERPL-MCNC: 58.1 NG/ML (ref 30–100)
ALBUMIN SERPL-MCNC: 5.1 G/DL (ref 3.5–5.2)
ALBUMIN/GLOB SERPL: 2.8 G/DL
ALP SERPL-CCNC: 53 U/L (ref 39–117)
ALT SERPL-CCNC: 16 U/L (ref 1–33)
AST SERPL-CCNC: 16 U/L (ref 1–32)
BILIRUB SERPL-MCNC: 0.4 MG/DL (ref 0.2–1.2)
BUN SERPL-MCNC: 13 MG/DL (ref 8–23)
BUN/CREAT SERPL: 19.4 (ref 7–25)
C PEPTIDE SERPL-MCNC: 3.2 NG/ML (ref 1.1–4.4)
CALCIUM SERPL-MCNC: 9.8 MG/DL (ref 8.6–10.5)
CHLORIDE SERPL-SCNC: 99 MMOL/L (ref 98–107)
CHOLEST SERPL-MCNC: 120 MG/DL (ref 100–199)
CO2 SERPL-SCNC: 26.1 MMOL/L (ref 22–29)
CREAT SERPL-MCNC: 0.67 MG/DL (ref 0.57–1)
GLOBULIN SER CALC-MCNC: 1.8 GM/DL
GLUCOSE SERPL-MCNC: 78 MG/DL (ref 65–99)
HBA1C MFR BLD: 5 % (ref 4.8–5.6)
HDL SERPL-SCNC: 43.3 UMOL/L
HDLC SERPL-MCNC: 60 MG/DL
LDL SERPL QN: 19.8 NM
LDL SERPL-SCNC: 396 NMOL/L
LDL SMALL SERPL-SCNC: 226 NMOL/L
LDLC SERPL CALC-MCNC: 39 MG/DL (ref 0–99)
MICROALBUMIN UR-MCNC: 10.3 UG/ML
POTASSIUM SERPL-SCNC: 4.4 MMOL/L (ref 3.5–5.2)
PROT SERPL-MCNC: 6.9 G/DL (ref 6–8.5)
SODIUM SERPL-SCNC: 141 MMOL/L (ref 136–145)
T4 SERPL-MCNC: 9.02 MCG/DL (ref 4.5–11.7)
TRIGL SERPL-MCNC: 104 MG/DL (ref 0–149)
TSH SERPL DL<=0.005 MIU/L-ACNC: 2.07 UIU/ML (ref 0.27–4.2)
URATE SERPL-MCNC: 4.3 MG/DL (ref 2.4–5.7)

## 2020-03-11 RX ORDER — DIGOXIN 250 MCG
250 TABLET ORAL DAILY
Qty: 30 TABLET | Refills: 4 | Status: SHIPPED | OUTPATIENT
Start: 2020-03-11 | End: 2020-07-20

## 2020-03-16 ENCOUNTER — OFFICE VISIT (OUTPATIENT)
Dept: ENDOCRINOLOGY | Age: 70
End: 2020-03-16

## 2020-03-16 VITALS
BODY MASS INDEX: 27.62 KG/M2 | SYSTOLIC BLOOD PRESSURE: 126 MMHG | WEIGHT: 176 LBS | DIASTOLIC BLOOD PRESSURE: 82 MMHG | HEIGHT: 67 IN

## 2020-03-16 DIAGNOSIS — E78.5 DYSLIPIDEMIA: ICD-10-CM

## 2020-03-16 DIAGNOSIS — E78.5 HYPERLIPIDEMIA, UNSPECIFIED HYPERLIPIDEMIA TYPE: ICD-10-CM

## 2020-03-16 DIAGNOSIS — E11.9 TYPE 2 DIABETES MELLITUS WITHOUT COMPLICATION, WITHOUT LONG-TERM CURRENT USE OF INSULIN (HCC): Primary | ICD-10-CM

## 2020-03-16 DIAGNOSIS — E55.9 VITAMIN D DEFICIENCY: ICD-10-CM

## 2020-03-16 PROBLEM — I10 BENIGN ESSENTIAL HTN: Status: ACTIVE | Noted: 2020-03-16

## 2020-03-16 PROCEDURE — 99214 OFFICE O/P EST MOD 30 MIN: CPT | Performed by: NURSE PRACTITIONER

## 2020-03-16 NOTE — PROGRESS NOTES
"Santiago Dunlap is a 69 y.o. female is here today for follow-up.  Chief Complaint   Patient presents with   • Diabetes     recent labs bgx2 daily pt has meter   • Hyperlipidemia   • Vitamin D Deficiency   • Fatigue     /82 (BP Location: Left arm, Patient Position: Sitting, Cuff Size: Adult)   Ht 170.2 cm (67.01\")   Wt 79.8 kg (176 lb)   BMI 27.56 kg/m²       History of Present Illness  Encounter Diagnoses   Name Primary?   • Type 2 diabetes mellitus without complication, without long-term current use of insulin (CMS/[facility]) Yes   • Vitamin D deficiency    • Dyslipidemia    • Hyperlipidemia, unspecified hyperlipidemia type    69-year-old female patient here today for follow-up visit.  She has been seen for the above-mentioned problems.  She has checked her blood sugars 2-3 times daily.  Her lowest blood glucose reading has been 66 however she is not on any medication that can cause hypoglycemia.  She is dieting and exercising at the [facility].  She is doing intermittent fasting along with a keto diet.  She is attending diabetes education classes.  In reviewing her medical record she does have a history of pancreatitis and is currently taking bydureon, she has been advised that this is contraindicated with this medication however she may continue to take it if she chooses.  She states she will consider her options.  She was educated today regarding mechanism of action of her current medications.  Her labs were reviewed and are in satisfactory range.    The following portions of the patient's history were reviewed and updated as appropriate: allergies, current medications, past family history, past medical history, past social history, past surgical history and problem list.    Review of Systems   Constitutional: Negative for appetite change and fatigue.   Eyes: Negative for visual disturbance.   Respiratory: Negative for cough.    Cardiovascular: Negative for leg swelling.   Gastrointestinal: Positive " for constipation. Negative for diarrhea.   Endocrine: Negative for cold intolerance, heat intolerance, polydipsia, polyphagia and polyuria.   Genitourinary: Negative for frequency.   Neurological: Negative for numbness.       Objective   Physical Exam   Constitutional: She is oriented to person, place, and time. She appears well-developed and well-nourished. No distress.   HENT:   Head: Normocephalic and atraumatic.   Right Ear: External ear normal.   Left Ear: External ear normal.   Nose: Nose normal.   Mouth/Throat: Oropharynx is clear and moist. No oropharyngeal exudate.   Eyes: Pupils are equal, round, and reactive to light. EOM are normal. Right eye exhibits no discharge. Left eye exhibits no discharge.   Neck: Trachea normal, normal range of motion and full passive range of motion without pain. Neck supple. No tracheal tenderness present. Carotid bruit is not present. No tracheal deviation, no edema and no erythema present. No thyroid mass and no thyromegaly present.   Cardiovascular: Normal rate, regular rhythm, normal heart sounds and intact distal pulses. Exam reveals no gallop and no friction rub.   No murmur heard.  Pulmonary/Chest: Effort normal and breath sounds normal. No stridor. No respiratory distress. She has no wheezes. She has no rales.   Abdominal: Soft. Bowel sounds are normal. She exhibits no distension.   Musculoskeletal: Normal range of motion. She exhibits no edema or deformity.    Diabetic foot exam performed: normal exam.   During the foot exam she had a monofilament test not performed.  Lymphadenopathy:     She has no cervical adenopathy.   Neurological: She is alert and oriented to person, place, and time.   Skin: Skin is warm and dry. No rash noted. She is not diaphoretic. No erythema. No pallor.   Psychiatric: She has a normal mood and affect. Her behavior is normal. Judgment and thought content normal.   Nursing note and vitals reviewed.    Results for orders placed or performed in  visit on 03/02/20   T4 & TSH ([facility])   Result Value Ref Range    TSH 2.070 0.270 - 4.200 uIU/mL    T4, Total 9.02 4.50 - 11.70 mcg/dL   Uric Acid   Result Value Ref Range    Uric Acid 4.3 2.4 - 5.7 mg/dL   Vitamin D 25 Hydroxy   Result Value Ref Range    25 Hydroxy, Vitamin D 58.1 30.0 - 100.0 ng/ml   Comprehensive Metabolic Panel   Result Value Ref Range    Glucose 78 65 - 99 mg/dL    BUN 13 8 - 23 mg/dL    Creatinine 0.67 0.57 - 1.00 mg/dL    eGFR Non African Am 88 >60 mL/min/1.73    eGFR African Am 106 >60 mL/min/1.73    BUN/Creatinine Ratio 19.4 7.0 - 25.0    Sodium 141 136 - 145 mmol/L    Potassium 4.4 3.5 - 5.2 mmol/L    Chloride 99 98 - 107 mmol/L    Total CO2 26.1 22.0 - 29.0 mmol/L    Calcium 9.8 8.6 - 10.5 mg/dL    Total Protein 6.9 6.0 - 8.5 g/dL    Albumin 5.10 3.50 - 5.20 g/dL    Globulin 1.8 gm/dL    A/G Ratio 2.8 g/dL    Total Bilirubin 0.4 0.2 - 1.2 mg/dL    Alkaline Phosphatase 53 39 - 117 U/L    AST (SGOT) 16 1 - 32 U/L    ALT (SGPT) 16 1 - 33 U/L   C-Peptide   Result Value Ref Range    C-Peptide 3.2 1.1 - 4.4 ng/mL   Hemoglobin A1c   Result Value Ref Range    Hemoglobin A1C 5.00 4.80 - 5.60 %   MicroAlbumin, Urine, Random - Urine, Clean Catch   Result Value Ref Range    Microalbumin, Urine 10.3 Not Estab. ug/mL   NMR LipoProfile   Result Value Ref Range    LDL-P 396 <1,000 nmol/L    LDL-C 39 0 - 99 mg/dL    HDL-C 60 >39 mg/dL    Triglycerides 104 0 - 149 mg/dL    Total Cholesterol 120 100 - 199 mg/dL    HDL-P (Total) 43.3 >=30.5 umol/L    Small LDL-P 226 <=527 nmol/L    LDL Size 19.8 (L) >20.5 nm         Assessment/Plan   Problems Addressed this Visit        Cardiovascular and Mediastinum    Hyperlipidemia       Digestive    Vitamin D deficiency       Endocrine    Type 2 diabetes mellitus without complication, without long-term current use of insulin (CMS/[facility]) - Primary       Other    Dyslipidemia        Metabolically and clinically patient is stable.  She will continue all her current  medications as prescribed with the exception of Bydureon.  She has been advised that this is contraindicated with a history of pancreatitis in which she has had on 2 different occasions.  Her hemoglobin A1c reflects good glycemic control.  Her cholesterol is controlled.  Blood pressures in satisfactory range.  Vitamin D is stable.  She will follow-up in 6 months with Dr. Vail or myself.  She is been encouraged to reach out to the office should she have any questions or concerns prior to her next visit

## 2020-03-16 NOTE — PATIENT INSTRUCTIONS
consider stopping bydureon Exenatide injection suspension, extended-release  What is this medicine?  EXENATIDE (ex EN a tide) is used to improve blood sugar control in adults with type 2 diabetes. This medicine may be used with other oral diabetes medicines.  This medicine may be used for other purposes; ask your health care provider or pharmacist if you have questions.  COMMON BRAND NAME(S): Byvarshareon  What should I tell my health care provider before I take this medicine?  They need to know if you have any of these conditions:  -endocrine tumors (MEN 2) or if someone in your family had these tumors  -history of pancreatitis  -kidney disease or if you are on dialysis  -stomach or intestine problems  -thyroid cancer or if someone in your family had thyroid cancer  -an unusual or allergic reaction to exenatide, medicines, foods, dyes, or preservatives  -pregnant or trying to get pregnant  -breast-feeding  How should I use this medicine?  This medicine is for injection under the skin of your upper leg, stomach area, or upper arm. It is usually given once every week (every 7 days). You will be taught how to prepare and give this medicine. Use exactly as directed. Take your medicine at regular intervals. Do not take it more often than directed.  It is important that you put your used needles and syringes in a special sharps container. Do not put them in a trash can. If you do not have a sharps container, call your pharmacist or healthcare provider to get one.  A special MedGuide will be given to you by the pharmacist with each prescription and refill. Be sure to read this information carefully each time.  Talk to your pediatrician regarding the use of this medicine in children. Special care may be needed.  Overdosage: If you think you have taken too much of this medicine contact a poison control center or emergency room at once.  NOTE: This medicine is only for you. Do not share this medicine with others.  What if I miss  a dose?  If you miss a dose, take it as soon as you can, provided your next usual scheduled dose is due at least 3 days later. If you miss a dose and your next usual scheduled dose is due 1 or 2 days later, then do not take the missed dose. Take the next dose at your regular time. Do not take double or extra doses. If you have questions about a missed dose, contact your health care provider for advice.  What may interact with this medicine?  -acetaminophen  -birth control pills  -digoxin  -insulin and other medicines for diabetes  -lisinopril  -lovastatin  -warfarin  Many medications may cause changes in blood sugar, these include:  -alcohol containing beverages  -antiviral medicines for HIV or AIDS  -aspirin and aspirin-like drugs  -certain medicines for blood pressure, heart disease, irregular heart beat  -chromium  -diuretics  -female hormones, such as estrogens or progestins, birth control pills  -fenofibrate  -gemfibrozil  -isoniazid  -lanreotide  -male hormones or anabolic steroids  -MAOIs like Carbex, Eldepryl, Marplan, Nardil, and Parnate  -medicines for weight loss  -medicines for allergies, asthma, cold, or cough  -medicines for depression, anxiety, or psychotic disturbances  -niacin  -nicotine  -NSAIDs, medicines for pain and inflammation, like ibuprofen or naproxen  -octreotide  -pasireotide  -pentamidine  -phenytoin  -probenecid  -quinolone antibiotics such as ciprofloxacin, levofloxacin, ofloxacin  -some herbal dietary supplements  -steroid medicines such as prednisone or cortisone  -sulfamethoxazole; trimethoprim  -thyroid hormones  Some medications can hide the warning symptoms of low blood sugar (hypoglycemia). You may need to monitor your blood sugar more closely if you are taking one of these medications. These include:  -beta-blockers, often used for high blood pressure or heart problems (examples include atenolol, metoprolol, propranolol)  -clonidine  -guanethidine  -reserpine  This list may not  describe all possible interactions. Give your health care provider a list of all the medicines, herbs, non-prescription drugs, or dietary supplements you use. Also tell them if you smoke, drink alcohol, or use illegal drugs. Some items may interact with your medicine.  What should I watch for while using this medicine?  Visit your doctor or health care professional for regular checks on your progress.  A test called the HbA1C (A1C) will be monitored. This is a simple blood test. It measures your blood sugar control over the last 2 to 3 months. You will receive this test every 3 to 6 months.  Learn how to check your blood sugar. Learn the symptoms of low and high blood sugar and how to manage them.  Always carry a quick-source of sugar with you in case you have symptoms of low blood sugar. Examples include hard sugar candy or glucose tablets. Make sure others know that you can choke if you eat or drink when you develop serious symptoms of low blood sugar, such as seizures or unconsciousness. They must get medical help at once.  Tell your doctor or health care professional if you have high blood sugar. You might need to change the dose of your medicine. If you are sick or exercising more than usual, you might need to change the dose of your medicine.  Do not skip meals. Ask your doctor or health care professional if you should avoid alcohol. Many nonprescription cough and cold products contain sugar or alcohol. These can affect blood sugar.  Pens should never be shared. Even if the needle is changed, sharing may result in passing of viruses like hepatitis or HIV.  Wear a medical ID bracelet or chain, and carry a card that describes your disease and details of your medicine and dosage times.  What side effects may I notice from receiving this medicine?  Side effects that you should report to your doctor or health care professional as soon as possible:  -allergic reactions like skin rash, itching or hives, swelling of  the face, lips, or tongue  -breathing problems  -diarrhea that continues or is severe  -lump or swelling on the neck  -severe nausea  -signs and symptoms of low blood sugar such as feeling anxious, confusion, dizziness, increased hunger, unusually weak or tired, sweating, shakiness, cold, irritable, headache, blurred vision, fast heartbeat, loss of consciousness  -signs and symptoms of kidney injury like trouble passing urine or change in the amount of urine  -trouble swallowing  -unusual stomach upset or pain  -vomiting  Side effects that usually do not require medical attention (report these to your doctor or health care professional if they continue or are bothersome):  -constipation  -diarrhea  -dizziness  -headache  -nausea  -pain, redness, or irritation at site where injected  -stomach upset  This list may not describe all possible side effects. Call your doctor for medical advice about side effects. You may report side effects to FDA at 7-282-FDA-2401.  Where should I keep my medicine?  Keep out of the reach of children.  Store this medicine in a refrigerator between 2 and 8 degrees C (36 and 46 degrees F). Do not freeze. Do not use if the medicine has been frozen. Protect from light and excessive heat. Each single-dose tray can be kept at a room temperature not to exceed 25 degrees C (77 degrees F) for no more than a total of 4 weeks, if needed. Throw away any unused medicine after the expiration date on the label.  NOTE: This sheet is a summary. It may not cover all possible information. If you have questions about this medicine, talk to your doctor, pharmacist, or health care provider.  © 2020 [employer]/Gold [employer] (2018-01-05 17:14:47)  due to history of pancreatitis

## 2020-06-19 ENCOUNTER — TELEPHONE (OUTPATIENT)
Dept: CARDIOLOGY | Facility: CLINIC | Age: 70
End: 2020-06-19

## 2020-06-25 DIAGNOSIS — E78.5 HYPERLIPIDEMIA, UNSPECIFIED HYPERLIPIDEMIA TYPE: Primary | ICD-10-CM

## 2020-06-25 DIAGNOSIS — R03.0 BORDERLINE SYSTOLIC HTN: ICD-10-CM

## 2020-06-25 DIAGNOSIS — R01.1 MURMUR: ICD-10-CM

## 2020-06-25 DIAGNOSIS — I49.1 PREMATURE ATRIAL CONTRACTION: ICD-10-CM

## 2020-06-25 DIAGNOSIS — I47.1 SUPRAVENTRICULAR TACHYCARDIA (HCC): ICD-10-CM

## 2020-06-26 ENCOUNTER — HOSPITAL ENCOUNTER (OUTPATIENT)
Dept: CARDIOLOGY | Facility: HOSPITAL | Age: 70
Discharge: HOME | End: 2020-06-26
Admitting: INTERNAL MEDICINE

## 2020-06-26 DIAGNOSIS — R01.1 MURMUR: ICD-10-CM

## 2020-06-26 DIAGNOSIS — I47.1 SUPRAVENTRICULAR TACHYCARDIA (HCC): ICD-10-CM

## 2020-06-26 DIAGNOSIS — I49.1 PREMATURE ATRIAL CONTRACTION: ICD-10-CM

## 2020-06-26 DIAGNOSIS — R03.0 BORDERLINE SYSTOLIC HTN: ICD-10-CM

## 2020-06-26 DIAGNOSIS — E78.5 HYPERLIPIDEMIA, UNSPECIFIED HYPERLIPIDEMIA TYPE: ICD-10-CM

## 2020-06-26 LAB
ALBUMIN SERPL-MCNC: 5 G/DL (ref 3.5–5.2)
ALBUMIN/GLOB SERPL: 2.4 G/DL
ALP SERPL-CCNC: 49 U/L (ref 39–117)
ALT SERPL W P-5'-P-CCNC: 16 U/L (ref 1–33)
ANION GAP SERPL CALCULATED.3IONS-SCNC: 10 MMOL/L (ref 5–15)
AST SERPL-CCNC: 19 U/L (ref 1–32)
BILIRUB SERPL-MCNC: 0.5 MG/DL (ref 0.2–1.2)
BUN BLD-MCNC: 17 MG/DL (ref 8–23)
BUN/CREAT SERPL: 30.9 (ref 7–25)
CALCIUM SPEC-SCNC: 9.9 MG/DL (ref 8.6–10.5)
CHLORIDE SERPL-SCNC: 101 MMOL/L (ref 98–107)
CHOLEST SERPL-MCNC: 154 MG/DL (ref 0–200)
CO2 SERPL-SCNC: 25 MMOL/L (ref 22–29)
CREAT BLD-MCNC: 0.55 MG/DL (ref 0.57–1)
GFR SERPL CREATININE-BSD FRML MDRD: 110 ML/MIN/1.73
GLOBULIN UR ELPH-MCNC: 2.1 GM/DL
GLUCOSE BLD-MCNC: 88 MG/DL (ref 65–99)
HDLC SERPL-MCNC: 70 MG/DL (ref 40–60)
LDLC SERPL CALC-MCNC: 61 MG/DL (ref 0–100)
LDLC/HDLC SERPL: 0.88 {RATIO}
POTASSIUM BLD-SCNC: 4 MMOL/L (ref 3.5–5.2)
PROT SERPL-MCNC: 7.1 G/DL (ref 6–8.5)
SODIUM BLD-SCNC: 136 MMOL/L (ref 136–145)
TRIGL SERPL-MCNC: 113 MG/DL (ref 0–150)
VLDLC SERPL-MCNC: 22.6 MG/DL (ref 5–40)

## 2020-06-26 PROCEDURE — 80053 COMPREHEN METABOLIC PANEL: CPT | Performed by: INTERNAL MEDICINE

## 2020-06-26 PROCEDURE — 80061 LIPID PANEL: CPT | Performed by: INTERNAL MEDICINE

## 2020-06-26 PROCEDURE — 36415 COLL VENOUS BLD VENIPUNCTURE: CPT | Performed by: INTERNAL MEDICINE

## 2020-07-06 ENCOUNTER — OFFICE VISIT (OUTPATIENT)
Dept: CARDIOLOGY | Facility: CLINIC | Age: 70
End: 2020-07-06

## 2020-07-06 VITALS
WEIGHT: 172 LBS | HEART RATE: 72 BPM | SYSTOLIC BLOOD PRESSURE: 151 MMHG | HEIGHT: 67 IN | BODY MASS INDEX: 27 KG/M2 | DIASTOLIC BLOOD PRESSURE: 82 MMHG

## 2020-07-06 DIAGNOSIS — R01.1 MURMUR: ICD-10-CM

## 2020-07-06 DIAGNOSIS — R07.2 PRECORDIAL PAIN: ICD-10-CM

## 2020-07-06 DIAGNOSIS — R09.89 BRUIT: ICD-10-CM

## 2020-07-06 DIAGNOSIS — E78.5 HYPERLIPIDEMIA, UNSPECIFIED HYPERLIPIDEMIA TYPE: ICD-10-CM

## 2020-07-06 DIAGNOSIS — R03.0 BORDERLINE SYSTOLIC HTN: ICD-10-CM

## 2020-07-06 DIAGNOSIS — I77.9 LEFT-SIDED CAROTID ARTERY DISEASE, UNSPECIFIED TYPE (HCC): Primary | ICD-10-CM

## 2020-07-06 DIAGNOSIS — I49.1 PREMATURE ATRIAL CONTRACTION: ICD-10-CM

## 2020-07-06 PROCEDURE — 93000 ELECTROCARDIOGRAM COMPLETE: CPT | Performed by: INTERNAL MEDICINE

## 2020-07-06 PROCEDURE — 99214 OFFICE O/P EST MOD 30 MIN: CPT | Performed by: INTERNAL MEDICINE

## 2020-07-06 RX ORDER — LATANOPROST 50 UG/ML
SOLUTION/ DROPS OPHTHALMIC
COMMUNITY
Start: 2020-06-22

## 2020-07-06 NOTE — PROGRESS NOTES
Subjective:        Delaney Dunlap is a 69 y.o. female who here for follow up    CC  Follow-up for the PACs hyperlipidemia murmur and hypertension  HPI  70-year-old female with known history of benign essential arterial hypertension, heart murmur PACs and hyperlipidemia here for the follow-up with no complaints of chest pains or tightness in the chest     Problem List Items Addressed This Visit        Cardiovascular and Mediastinum    Hyperlipidemia    Premature atrial contraction - Primary    Murmur    Borderline systolic HTN        .  Total Cholesterol  0 - 200 mg/dL 154    Triglycerides  0 - 150 mg/dL 113    HDL Cholesterol  40 - 60 mg/dL 70High     LDL Cholesterol   0 - 100 mg/dL 61    VLDL Cholesterol  5 - 40 mg/dL 22.6    LDL/HDL Ratio 0.88      Ref Range & Units 10d ago   Glucose  65 - 99 mg/dL 88    BUN  8 - 23 mg/dL 17    Creatinine  0.57 - 1.00 mg/dL 0.55Low     Sodium  136 - 145 mmol/L 136    Potassium  3.5 - 5.2 mmol/L 4.0    Chloride  98 - 107 mmol/L 101    CO2  22.0 - 29.0 mmol/L 25.0    Calcium  8.6 - 10.5 mg/dL 9.9    Total Protein  6.0 - 8.5 g/dL 7.1    Albumin  3.50 - 5.20 g/dL 5.00    ALT (SGPT)  1 - 33 U/L 16    AST (SGOT)  1 - 32 U/L 19    Alkaline Phosphatase  39 - 117 U/L 49    Total Bilirubin  0.2 - 1.2 mg/dL 0.5    eGFR Non African Amer  >60 mL/min/1.73 110    Globulin  gm/dL 2.1    A/G Ratio  g/dL 2.4    BUN/Creatinine Ratio  7.0 - 25.0 30.9High           The following portions of the patient's history were reviewed and updated as appropriate: allergies, current medications, past family history, past medical history, past social history, past surgical history and problem list.    Past Medical History:   Diagnosis Date   • Diabetes mellitus (CMS/HCC)    • Glaucoma    • Hyperlipidemia    • Pancreatitis    • SVT (supraventricular tachycardia) (CMS/HCC)    • Type 2 diabetes mellitus (CMS/HCC)      reports that she has never smoked. She has never used smokeless tobacco. She reports that she  "does not drink alcohol or use drugs.   Family History   Problem Relation Age of Onset   • Heart attack Mother    • Glaucoma Mother    • Arrhythmia Father    • Angina Father    • Glaucoma Sister    • Breast cancer Sister    • Colon cancer Brother        Review of Systems  Constitutional: No wt loss, fever, fatigue  Gastrointestinal: No nausea, abdominal pain  Behavioral/Psych: No insomnia or anxiety   Cardiovascular no chest pains or tightness in the chest  Objective:       Physical Exam    /82   Pulse 72   Ht 170.2 cm (67\")   Wt 78 kg (172 lb)   BMI 26.94 kg/m²   General appearance: No acute changes   Neck: Trachea midline; NECK, supple, no thyromegaly or lymphadenopathy   Lungs: Normal size and shape, normal breath sounds, equal distribution of air, no rales and rhonchi   CV: S1-S2 regular, sys murmurs, no rub, no gallop   Abdomen: Soft, non-tender; no masses , no abnormal abdominal sounds   Extremities: No deformity , normal color , no peripheral edema   Skin: Normal temperature, turgor and texture; no rash, ulcers            ECG 12 Lead  Date/Time: 7/6/2020 10:35 AM  Performed by: Karthikeyan Alvarado MD  Authorized by: Karthikeyan Alvarado MD   Comparison: compared with previous ECG   Similar to previous ECG  Rhythm: sinus rhythm  ST Flattening: all    Clinical impression: non-specific ECG              Echocardiogram:        Current Outpatient Medications:   •  digoxin (DIGOX) 250 MCG tablet, Take 1 tablet by mouth Daily., Disp: 30 tablet, Rfl: 4  •  ergocalciferol (DRISDOL) 06086 units capsule, Take 1 capsule by mouth 1 (One) Time Per Week., Disp: 13 capsule, Rfl: 3  •  latanoprost (XALATAN) 0.005 % ophthalmic solution, INT 1 GTT IN OU Q NIGHT, Disp: , Rfl:   •  Multiple Vitamins-Minerals (MULTIVITAMIN WOMEN 50+) tablet, Take  by mouth., Disp: , Rfl:   •  multivitamins-minerals (PRESERVISION AREDS 2) capsule capsule, Take 1 capsule by mouth 2 (Two) Times a Day., Disp: , Rfl:   •  ONETOUCH DELICA " LANCETS FINE misc, USE AS DIRECTED TWICE DAILY, Disp: 200 each, Rfl: 3  •  ONETOUCH VERIO test strip, Check blood glucose twice daily Use as instructed, Disp: 200 each, Rfl: 3  •  rosuvastatin (CRESTOR) 20 MG tablet, Take 1 tablet by mouth Every Night., Disp: 90 tablet, Rfl: 3  •  SYNJARDY XR 12.5-1000 MG tablet sustained-release 24 hour, Take 2 tablets by mouth Daily., Disp: 180 tablet, Rfl: 3   Assessment:        Patient Active Problem List   Diagnosis   • Hyperlipidemia   • Premature atrial contraction   • Supraventricular tachycardia (CMS/HCC)   • Dyslipidemia   • Murmur   • Borderline systolic HTN   • Chronic fatigue   • Type 2 diabetes mellitus without complication, without long-term current use of insulin (CMS/HCC)   • Vitamin D deficiency               Plan:            ICD-10-CM ICD-9-CM   1. Premature atrial contraction I49.1 427.61   2. Murmur R01.1 785.2   3. Hyperlipidemia, unspecified hyperlipidemia type E78.5 272.4   4. Borderline systolic HTN R03.0 796.2     1. Premature atrial contraction  Asymptomatic    2. Murmur  Considering patient's medical condition as well as the risk factors, patient will require echocardiogram for further evaluation for the LV function, four-chamber evaluation, including the pressures, valvular function and  pericardial disease and pericardial effusion      3. Hyperlipidemia, unspecified hyperlipidemia type  Continue current treatment    4. Borderline systolic HTN  Under control BP much better at home       bp better at home    Stress cardiology, echo, us carotids        COUNSELING:    Delaney Peña was given to patient for the following topics: diagnostic results, risk factor reductions, impressions, risks and benefits of treatment options and importance of treatment compliance .       SMOKING COUNSELING:    [unfilled]    Dictated using Dragon dictation

## 2020-07-20 RX ORDER — DIGOXIN 250 MCG
250 TABLET ORAL DAILY
Qty: 30 TABLET | Refills: 4 | Status: SHIPPED | OUTPATIENT
Start: 2020-07-20 | End: 2020-12-28

## 2020-07-22 ENCOUNTER — HOSPITAL ENCOUNTER (OUTPATIENT)
Dept: CARDIOLOGY | Facility: HOSPITAL | Age: 70
Discharge: HOME | End: 2020-07-22
Admitting: INTERNAL MEDICINE

## 2020-07-22 LAB
BH CV XLRA MEAS LEFT DIST CCA EDV: 13 CM/SEC
BH CV XLRA MEAS LEFT DIST CCA PSV: 64.5 CM/SEC
BH CV XLRA MEAS LEFT DIST ICA EDV: -15.4 CM/SEC
BH CV XLRA MEAS LEFT DIST ICA PSV: -68.5 CM/SEC
BH CV XLRA MEAS LEFT ICA/CCA RATIO: 1.08
BH CV XLRA MEAS LEFT MID ICA EDV: -16.7 CM/SEC
BH CV XLRA MEAS LEFT MID ICA PSV: -69.8 CM/SEC
BH CV XLRA MEAS LEFT PROX CCA EDV: 19 CM/SEC
BH CV XLRA MEAS LEFT PROX CCA PSV: 114 CM/SEC
BH CV XLRA MEAS LEFT PROX ECA EDV: -11.4 CM/SEC
BH CV XLRA MEAS LEFT PROX ECA PSV: -80.3 CM/SEC
BH CV XLRA MEAS LEFT PROX ICA EDV: 18 CM/SEC
BH CV XLRA MEAS LEFT PROX ICA PSV: 61 CM/SEC
BH CV XLRA MEAS LEFT PROX SCLA PSV: 120.4 CM/SEC
BH CV XLRA MEAS LEFT VERTEBRAL A EDV: -6.5 CM/SEC
BH CV XLRA MEAS LEFT VERTEBRAL A PSV: -35.4 CM/SEC
BH CV XLRA MEAS RIGHT DIST CCA EDV: 19.7 CM/SEC
BH CV XLRA MEAS RIGHT DIST CCA PSV: 71.3 CM/SEC
BH CV XLRA MEAS RIGHT DIST ICA EDV: -23.6 CM/SEC
BH CV XLRA MEAS RIGHT DIST ICA PSV: -81.1 CM/SEC
BH CV XLRA MEAS RIGHT ICA/CCA RATIO: 1.14
BH CV XLRA MEAS RIGHT MID ICA EDV: -24.3 CM/SEC
BH CV XLRA MEAS RIGHT MID ICA PSV: -70.9 CM/SEC
BH CV XLRA MEAS RIGHT PROX CCA EDV: 16.2 CM/SEC
BH CV XLRA MEAS RIGHT PROX CCA PSV: 94.4 CM/SEC
BH CV XLRA MEAS RIGHT PROX ECA EDV: -14.9 CM/SEC
BH CV XLRA MEAS RIGHT PROX ECA PSV: -96.9 CM/SEC
BH CV XLRA MEAS RIGHT PROX ICA EDV: -20.4 CM/SEC
BH CV XLRA MEAS RIGHT PROX ICA PSV: -70.9 CM/SEC
BH CV XLRA MEAS RIGHT PROX SCLA PSV: 113.3 CM/SEC
BH CV XLRA MEAS RIGHT VERTEBRAL A EDV: -13.8 CM/SEC
BH CV XLRA MEAS RIGHT VERTEBRAL A PSV: -53.3 CM/SEC
LEFT ARM BP: NORMAL MMHG
RIGHT ARM BP: NORMAL MMHG

## 2020-07-22 PROCEDURE — 93880 EXTRACRANIAL BILAT STUDY: CPT

## 2020-07-30 ENCOUNTER — HOSPITAL ENCOUNTER (OUTPATIENT)
Dept: CARDIOLOGY | Facility: HOSPITAL | Age: 70
Discharge: HOME | End: 2020-07-30

## 2020-07-30 ENCOUNTER — HOSPITAL ENCOUNTER (OUTPATIENT)
Dept: CARDIOLOGY | Facility: HOSPITAL | Age: 70
Discharge: HOME | End: 2020-07-30
Admitting: INTERNAL MEDICINE

## 2020-07-30 VITALS
DIASTOLIC BLOOD PRESSURE: 65 MMHG | HEART RATE: 57 BPM | BODY MASS INDEX: 27 KG/M2 | SYSTOLIC BLOOD PRESSURE: 147 MMHG | WEIGHT: 172 LBS | HEIGHT: 67 IN

## 2020-07-30 VITALS
DIASTOLIC BLOOD PRESSURE: 72 MMHG | RESPIRATION RATE: 16 BRPM | WEIGHT: 172 LBS | HEART RATE: 55 BPM | SYSTOLIC BLOOD PRESSURE: 145 MMHG | HEIGHT: 67 IN | OXYGEN SATURATION: 100 % | BODY MASS INDEX: 27 KG/M2

## 2020-07-30 LAB
AORTIC DIMENSIONLESS INDEX: 0.5 (DI)
BH CV ECHO MEAS - ACS: 2 CM
BH CV ECHO MEAS - AO MAX PG (FULL): 12.7 MMHG
BH CV ECHO MEAS - AO MAX PG: 17.1 MMHG
BH CV ECHO MEAS - AO MEAN PG (FULL): 6.6 MMHG
BH CV ECHO MEAS - AO MEAN PG: 8.8 MMHG
BH CV ECHO MEAS - AO ROOT AREA (BSA CORRECTED): 1.6
BH CV ECHO MEAS - AO ROOT AREA: 7.1 CM^2
BH CV ECHO MEAS - AO ROOT DIAM: 3 CM
BH CV ECHO MEAS - AO V2 MAX: 206.6 CM/SEC
BH CV ECHO MEAS - AO V2 MEAN: 138.8 CM/SEC
BH CV ECHO MEAS - AO V2 VTI: 48.8 CM
BH CV ECHO MEAS - AVA(I,A): 1.9 CM^2
BH CV ECHO MEAS - AVA(I,D): 1.9 CM^2
BH CV ECHO MEAS - AVA(V,A): 1.8 CM^2
BH CV ECHO MEAS - AVA(V,D): 1.8 CM^2
BH CV ECHO MEAS - BSA(HAYCOCK): 1.9 M^2
BH CV ECHO MEAS - BSA: 1.9 M^2
BH CV ECHO MEAS - BZI_BMI: 26.9 KILOGRAMS/M^2
BH CV ECHO MEAS - BZI_METRIC_HEIGHT: 170.2 CM
BH CV ECHO MEAS - BZI_METRIC_WEIGHT: 78 KG
BH CV ECHO MEAS - EDV(CUBED): 107.1 ML
BH CV ECHO MEAS - EDV(MOD-SP2): 103 ML
BH CV ECHO MEAS - EDV(MOD-SP4): 119 ML
BH CV ECHO MEAS - EDV(TEICH): 104.9 ML
BH CV ECHO MEAS - EF(CUBED): 68.9 %
BH CV ECHO MEAS - EF(MOD-BP): 63.1 %
BH CV ECHO MEAS - EF(MOD-SP2): 66 %
BH CV ECHO MEAS - EF(MOD-SP4): 61.3 %
BH CV ECHO MEAS - EF(TEICH): 60.4 %
BH CV ECHO MEAS - ESV(CUBED): 33.3 ML
BH CV ECHO MEAS - ESV(MOD-SP2): 35 ML
BH CV ECHO MEAS - ESV(MOD-SP4): 46 ML
BH CV ECHO MEAS - ESV(TEICH): 41.5 ML
BH CV ECHO MEAS - FS: 32.2 %
BH CV ECHO MEAS - IVS/LVPW: 0.93
BH CV ECHO MEAS - IVSD: 0.78 CM
BH CV ECHO MEAS - LAT PEAK E' VEL: 15.7 CM/SEC
BH CV ECHO MEAS - LV DIASTOLIC VOL/BSA (35-75): 62.7 ML/M^2
BH CV ECHO MEAS - LV MASS(C)D: 125.1 GRAMS
BH CV ECHO MEAS - LV MASS(C)DI: 65.9 GRAMS/M^2
BH CV ECHO MEAS - LV MAX PG: 4.4 MMHG
BH CV ECHO MEAS - LV MEAN PG: 2.2 MMHG
BH CV ECHO MEAS - LV SYSTOLIC VOL/BSA (12-30): 24.3 ML/M^2
BH CV ECHO MEAS - LV V1 MAX: 104.3 CM/SEC
BH CV ECHO MEAS - LV V1 MEAN: 68 CM/SEC
BH CV ECHO MEAS - LV V1 VTI: 26.1 CM
BH CV ECHO MEAS - LVIDD: 4.7 CM
BH CV ECHO MEAS - LVIDS: 3.2 CM
BH CV ECHO MEAS - LVLD AP2: 7.9 CM
BH CV ECHO MEAS - LVLD AP4: 8.2 CM
BH CV ECHO MEAS - LVLS AP2: 6.3 CM
BH CV ECHO MEAS - LVLS AP4: 6.8 CM
BH CV ECHO MEAS - LVOT AREA (M): 3.5 CM^2
BH CV ECHO MEAS - LVOT AREA: 3.6 CM^2
BH CV ECHO MEAS - LVOT DIAM: 2.1 CM
BH CV ECHO MEAS - LVPWD: 0.83 CM
BH CV ECHO MEAS - MED PEAK E' VEL: 9.1 CM/SEC
BH CV ECHO MEAS - MR MAX PG: 120.1 MMHG
BH CV ECHO MEAS - MR MAX VEL: 547.9 CM/SEC
BH CV ECHO MEAS - MV A DUR: 0.14 SEC
BH CV ECHO MEAS - MV A MAX VEL: 92.6 CM/SEC
BH CV ECHO MEAS - MV DEC SLOPE: 771 CM/SEC^2
BH CV ECHO MEAS - MV DEC TIME: 172 SEC
BH CV ECHO MEAS - MV E MAX VEL: 117.4 CM/SEC
BH CV ECHO MEAS - MV E/A: 1.3
BH CV ECHO MEAS - MV MAX PG: 7.4 MMHG
BH CV ECHO MEAS - MV MEAN PG: 4 MMHG
BH CV ECHO MEAS - MV P1/2T MAX VEL: 170.3 CM/SEC
BH CV ECHO MEAS - MV P1/2T: 64.7 MSEC
BH CV ECHO MEAS - MV V2 MAX: 135.7 CM/SEC
BH CV ECHO MEAS - MV V2 MEAN: 96.7 CM/SEC
BH CV ECHO MEAS - MV V2 VTI: 35.7 CM
BH CV ECHO MEAS - MVA P1/2T LCG: 1.3 CM^2
BH CV ECHO MEAS - MVA(P1/2T): 3.4 CM^2
BH CV ECHO MEAS - MVA(VTI): 2.6 CM^2
BH CV ECHO MEAS - PA ACC TIME: 0.1 SEC
BH CV ECHO MEAS - PA MAX PG (FULL): 3 MMHG
BH CV ECHO MEAS - PA MAX PG: 5.5 MMHG
BH CV ECHO MEAS - PA PR(ACCEL): 36.2 MMHG
BH CV ECHO MEAS - PA V2 MAX: 116.7 CM/SEC
BH CV ECHO MEAS - PULM A REVS DUR: 0.11 SEC
BH CV ECHO MEAS - PULM A REVS VEL: 33.5 CM/SEC
BH CV ECHO MEAS - PULM DIAS VEL: 72.8 CM/SEC
BH CV ECHO MEAS - PULM S/D: 0.56
BH CV ECHO MEAS - PULM SYS VEL: 40.7 CM/SEC
BH CV ECHO MEAS - PVA(V,A): 1.9 CM^2
BH CV ECHO MEAS - PVA(V,D): 1.9 CM^2
BH CV ECHO MEAS - QP/QS: 0.6
BH CV ECHO MEAS - RAP SYSTOLE: 3 MMHG
BH CV ECHO MEAS - RV MAX PG: 2.5 MMHG
BH CV ECHO MEAS - RV MEAN PG: 1.4 MMHG
BH CV ECHO MEAS - RV V1 MAX: 79.1 CM/SEC
BH CV ECHO MEAS - RV V1 MEAN: 56.7 CM/SEC
BH CV ECHO MEAS - RV V1 VTI: 19.8 CM
BH CV ECHO MEAS - RVOT AREA: 2.8 CM^2
BH CV ECHO MEAS - RVOT DIAM: 1.9 CM
BH CV ECHO MEAS - RVSP: 32 MMHG
BH CV ECHO MEAS - SI(AO): 183 ML/M^2
BH CV ECHO MEAS - SI(CUBED): 38.9 ML/M^2
BH CV ECHO MEAS - SI(LVOT): 48.9 ML/M^2
BH CV ECHO MEAS - SI(MOD-SP2): 35.9 ML/M^2
BH CV ECHO MEAS - SI(MOD-SP4): 38.5 ML/M^2
BH CV ECHO MEAS - SI(TEICH): 33.4 ML/M^2
BH CV ECHO MEAS - SV(AO): 347 ML
BH CV ECHO MEAS - SV(CUBED): 73.8 ML
BH CV ECHO MEAS - SV(LVOT): 92.7 ML
BH CV ECHO MEAS - SV(MOD-SP2): 68 ML
BH CV ECHO MEAS - SV(MOD-SP4): 73 ML
BH CV ECHO MEAS - SV(RVOT): 55.5 ML
BH CV ECHO MEAS - SV(TEICH): 63.4 ML
BH CV ECHO MEAS - TAPSE (>1.6): 2.9 CM
BH CV ECHO MEAS - TR MAX PG: 29 MMHG
BH CV ECHO MEAS - TR MAX VEL: 268.5 CM/SEC
BH CV ECHO MEASUREMENTS AVERAGE E/E' RATIO: 9.47
BH CV STRESS BP STAGE 1: NORMAL
BH CV STRESS BP STAGE 2: NORMAL
BH CV STRESS BP STAGE 3: NORMAL
BH CV STRESS BP STAGE 4: NORMAL
BH CV STRESS DURATION MIN STAGE 1: 2
BH CV STRESS DURATION MIN STAGE 2: 3
BH CV STRESS DURATION MIN STAGE 3: 3
BH CV STRESS DURATION MIN STAGE 4: 2
BH CV STRESS DURATION SEC STAGE 1: 50
BH CV STRESS DURATION SEC STAGE 2: 0
BH CV STRESS DURATION SEC STAGE 3: 0
BH CV STRESS DURATION SEC STAGE 4: 21
BH CV STRESS GRADE STAGE 1: 5
BH CV STRESS GRADE STAGE 2: 10
BH CV STRESS GRADE STAGE 3: 12
BH CV STRESS GRADE STAGE 4: 14
BH CV STRESS HR STAGE 1: 88
BH CV STRESS HR STAGE 2: 102
BH CV STRESS HR STAGE 3: 125
BH CV STRESS HR STAGE 4: 131
BH CV STRESS METS STAGE 1: 3.4
BH CV STRESS METS STAGE 2: 4.6
BH CV STRESS METS STAGE 3: 7
BH CV STRESS METS STAGE 4: 9
BH CV STRESS PROTOCOL 1: NORMAL
BH CV STRESS RECOVERY BP: NORMAL MMHG
BH CV STRESS RECOVERY HR: 70 BPM
BH CV STRESS RECOVERY O2: 100 %
BH CV STRESS SPEED STAGE 1: 1.7
BH CV STRESS SPEED STAGE 2: 1.7
BH CV STRESS SPEED STAGE 3: 2.5
BH CV STRESS SPEED STAGE 4: 3
BH CV STRESS STAGE 1: NORMAL
BH CV STRESS STAGE 2: 1
BH CV STRESS STAGE 3: 2
BH CV STRESS STAGE 4: 3
BH CV XLRA - RV BASE: 3.2 CM
BH CV XLRA - RV LENGTH: 5.4 CM
BH CV XLRA - RV MID: 2.6 CM
BH CV XLRA - TDI S': 13.5 CM/SEC
LEFT ATRIUM VOLUME INDEX: 26.4 ML/M2
LV EF NUC BP: 66 %
MAXIMAL PREDICTED HEART RATE: 151 BPM
MAXIMAL PREDICTED HEART RATE: 151 BPM
PERCENT MAX PREDICTED HR: 86.75 %
STRESS BASELINE BP: NORMAL MMHG
STRESS BASELINE HR: 58 BPM
STRESS O2 SAT REST: 100 %
STRESS PERCENT HR: 102 %
STRESS POST ESTIMATED WORKLOAD: 9 METS
STRESS POST EXERCISE DUR MIN: 10 MIN
STRESS POST EXERCISE DUR SEC: 10 SEC
STRESS POST PEAK BP: NORMAL MMHG
STRESS POST PEAK HR: 131 BPM
STRESS TARGET HR: 128 BPM
STRESS TARGET HR: 128 BPM

## 2020-07-30 PROCEDURE — 93306 TTE W/DOPPLER COMPLETE: CPT

## 2020-07-30 PROCEDURE — A9500 TC99M SESTAMIBI: HCPCS | Performed by: INTERNAL MEDICINE

## 2020-07-30 PROCEDURE — 0 TECHNETIUM SESTAMIBI: Performed by: INTERNAL MEDICINE

## 2020-07-30 PROCEDURE — 93018 CV STRESS TEST I&R ONLY: CPT | Performed by: INTERNAL MEDICINE

## 2020-07-30 PROCEDURE — 93016 CV STRESS TEST SUPVJ ONLY: CPT | Performed by: INTERNAL MEDICINE

## 2020-07-30 PROCEDURE — 93017 CV STRESS TEST TRACING ONLY: CPT

## 2020-07-30 PROCEDURE — 78452 HT MUSCLE IMAGE SPECT MULT: CPT | Performed by: INTERNAL MEDICINE

## 2020-07-30 PROCEDURE — 93306 TTE W/DOPPLER COMPLETE: CPT | Performed by: INTERNAL MEDICINE

## 2020-07-30 PROCEDURE — 78452 HT MUSCLE IMAGE SPECT MULT: CPT

## 2020-07-30 RX ADMIN — TECHNETIUM TC 99M SESTAMIBI 1 DOSE: 1 INJECTION INTRAVENOUS at 08:42

## 2020-07-30 RX ADMIN — TECHNETIUM TC 99M SESTAMIBI 1 DOSE: 1 INJECTION INTRAVENOUS at 12:14

## 2020-07-31 PROBLEM — I35.9 AORTIC VALVE DISEASE: Status: ACTIVE | Noted: 2020-07-31

## 2020-08-10 ENCOUNTER — OFFICE VISIT (OUTPATIENT)
Dept: CARDIOLOGY | Facility: CLINIC | Age: 70
End: 2020-08-10

## 2020-08-10 VITALS
HEIGHT: 67 IN | SYSTOLIC BLOOD PRESSURE: 117 MMHG | HEART RATE: 55 BPM | BODY MASS INDEX: 26.68 KG/M2 | DIASTOLIC BLOOD PRESSURE: 60 MMHG | WEIGHT: 170 LBS

## 2020-08-10 DIAGNOSIS — E78.5 HYPERLIPIDEMIA, UNSPECIFIED HYPERLIPIDEMIA TYPE: ICD-10-CM

## 2020-08-10 DIAGNOSIS — I35.9 AORTIC VALVE DISEASE: Primary | ICD-10-CM

## 2020-08-10 DIAGNOSIS — R03.0 BORDERLINE SYSTOLIC HTN: ICD-10-CM

## 2020-08-10 PROCEDURE — 99213 OFFICE O/P EST LOW 20 MIN: CPT | Performed by: INTERNAL MEDICINE

## 2020-08-10 NOTE — PROGRESS NOTES
You have chosen to receive care through a telephone visit. Do you consent to use a telephone visit for your medical care today? Yes        Results   Subjective:        Delaney Dunlap is a 70 y.o. female who here for follow up    CC  Telephone checkup for the results  HPI  70-year-old female with known history of hypertension aortic valve disease hyperlipidemia here for the follow-up after the stress test and echocardiogram which are normal     Problem List Items Addressed This Visit        Cardiovascular and Mediastinum    Hyperlipidemia    Borderline systolic HTN    Aortic valve disease - Primary      Interpretation Summary        · Moderate risk for ischemic heart disease.  · Findings consistent with an abnormal ECG stress test.  · Left ventricular ejection fraction is normal (Calculated EF = 66%).  · Myocardial perfusion imaging indicates a normal myocardial perfusion study with no evidence of ischemia.  · Impressions are consistent with a low risk study.     Interpretation Summary     · Aortic valve dimensionless index is 0.5.  · Mild-to-moderate mitral valve regurgitation is present  · Calculated EF = 63.1%.  · There is calcification of the aortic valve.  · There is no evidence of pericardial effusion.     Interpretation Summary     · Proximal right internal carotid artery plaque without significant stenosis.  · Proximal left internal carotid artery plaque without significant stenosis.          .    The following portions of the patient's history were reviewed and updated as appropriate: allergies, current medications, past family history, past medical history, past social history, past surgical history and problem list.    Past Medical History:   Diagnosis Date   • Diabetes mellitus (CMS/[facility])    • Glaucoma    • Hyperlipidemia    • Pancreatitis    • SVT (supraventricular tachycardia) (CMS/[facility])    • Type 2 diabetes mellitus (CMS/[facility])      reports that she has never smoked. She has never used smokeless  tobacco. She reports that she does not drink alcohol or use drugs.   Family History   Problem Relation Age of Onset   • Heart attack Mother    • Glaucoma Mother    • Arrhythmia Father    • Angina Father    • Glaucoma Sister    • Breast cancer Sister    • Colon cancer Brother        Review of Systems  Constitutional: No wt loss, fever, fatigue  Gastrointestinal: No nausea, abdominal pain  Behavioral/Psych: No insomnia or anxiety   Cardiovascular no chest pains or tightness in the chest  Objective:       Physical Exam  Telephone checkup only    Procedures      Echocardiogram:        Current Outpatient Medications:   •  digoxin (LANOXIN) 250 MCG tablet, TAKE 1 TABLET BY MOUTH DAILY, Disp: 30 tablet, Rfl: 4  •  ergocalciferol (DRISDOL) 43066 units capsule, Take 1 capsule by mouth 1 (One) Time Per Week., Disp: 13 capsule, Rfl: 3  •  latanoprost (XALATAN) 0.005 % ophthalmic solution, INT 1 GTT IN OU Q NIGHT, Disp: , Rfl:   •  Multiple Vitamins-Minerals (MULTIVITAMIN WOMEN 50+) tablet, Take  by mouth., Disp: , Rfl:   •  multivitamins-minerals (PRESERVISION AREDS 2) capsule capsule, Take 1 capsule by mouth 2 (Two) Times a Day., Disp: , Rfl:   •  ONETOUCH DELICA LANCETS FINE misc, USE AS DIRECTED TWICE DAILY, Disp: 200 each, Rfl: 3  •  ONETOUCH VERIO test strip, Check blood glucose twice daily Use as instructed, Disp: 200 each, Rfl: 3  •  rosuvastatin (CRESTOR) 20 MG tablet, Take 1 tablet by mouth Every Night., Disp: 90 tablet, Rfl: 3  •  SYNJARDY XR 12.5-1000 MG tablet sustained-release 24 hour, Take 2 tablets by mouth Daily., Disp: 180 tablet, Rfl: 3   Assessment:        Patient Active Problem List   Diagnosis   • Hyperlipidemia   • Premature atrial contraction   • Supraventricular tachycardia (CMS/HCC)   • Dyslipidemia   • Murmur   • Borderline systolic HTN   • Chronic fatigue   • Type 2 diabetes mellitus without complication, without long-term current use of insulin (CMS/HCC)   • Vitamin D deficiency   • Left-sided  carotid artery disease (CMS/[facility])   • Aortic valve disease               Plan:            ICD-10-CM ICD-9-CM   1. Aortic valve disease I35.9 424.1   2. Borderline systolic HTN R03.0 796.2   3. Hyperlipidemia, unspecified hyperlipidemia type E78.5 272.4     1. Aortic valve disease  Stable    2. Borderline systolic HTN  Blood pressure under control    3. Hyperlipidemia, unspecified hyperlipidemia type  Continue current treatment  1 YR     Specificity and sensitivity of the stress test/ cardiac workup has been explained. Pt has been explained if  Symptoms continue please go to ER, and further w/p will be required.    Also explained this does not rule out coronary artery disease or the future events, continue to emphasize on risk reductions for coronary artery disease    Pt also advised to contact PCP for other causes of symptoms    This patient has consented to a telehealth visit via telephone. The visit was scheduled as a telephone visit to comply with patient safety concerns in accordance with CDC recommendations.  All vitals recorded within this visit are reported by the patient.  I spent 12 minutes in total including but not limited to the 12 minutes spent in direct conversation with this patient.       COUNSELING:    Delaney Peña was given to patient for the following topics: diagnostic results, risk factor reductions, impressions, risks and benefits of treatment options and importance of treatment compliance .       SMOKING COUNSELING:    [unfilled]    Dictated using Dragon dictation

## 2020-08-31 ENCOUNTER — LAB (OUTPATIENT)
Dept: ENDOCRINOLOGY | Age: 70
End: 2020-08-31

## 2020-08-31 DIAGNOSIS — R53.82 CHRONIC FATIGUE: ICD-10-CM

## 2020-08-31 DIAGNOSIS — E11.9 TYPE 2 DIABETES MELLITUS WITHOUT COMPLICATION, WITHOUT LONG-TERM CURRENT USE OF INSULIN (HCC): ICD-10-CM

## 2020-08-31 DIAGNOSIS — E78.5 DYSLIPIDEMIA: ICD-10-CM

## 2020-08-31 DIAGNOSIS — E55.9 VITAMIN D DEFICIENCY: ICD-10-CM

## 2020-08-31 DIAGNOSIS — E78.5 HYPERLIPIDEMIA, UNSPECIFIED HYPERLIPIDEMIA TYPE: ICD-10-CM

## 2020-08-31 DIAGNOSIS — E78.5 HYPERLIPIDEMIA, UNSPECIFIED HYPERLIPIDEMIA TYPE: Primary | ICD-10-CM

## 2020-08-31 RX ORDER — ROSUVASTATIN CALCIUM 20 MG/1
20 TABLET, COATED ORAL NIGHTLY
Qty: 90 TABLET | Refills: 3 | Status: SHIPPED | OUTPATIENT
Start: 2020-08-31 | End: 2021-01-07 | Stop reason: SDUPTHER

## 2020-09-01 LAB
25(OH)D3+25(OH)D2 SERPL-MCNC: 59 NG/ML (ref 30–100)
ALBUMIN SERPL-MCNC: 5 G/DL (ref 3.5–5.2)
ALBUMIN/GLOB SERPL: 3.3 G/DL
ALP SERPL-CCNC: 48 U/L (ref 39–117)
ALT SERPL-CCNC: 14 U/L (ref 1–33)
AST SERPL-CCNC: 15 U/L (ref 1–32)
BILIRUB SERPL-MCNC: 0.4 MG/DL (ref 0–1.2)
BUN SERPL-MCNC: 17 MG/DL (ref 8–23)
BUN/CREAT SERPL: 31.5 (ref 7–25)
C PEPTIDE SERPL-MCNC: 2.6 NG/ML (ref 1.1–4.4)
CALCIUM SERPL-MCNC: 9.7 MG/DL (ref 8.6–10.5)
CHLORIDE SERPL-SCNC: 104 MMOL/L (ref 98–107)
CHOLEST SERPL-MCNC: 143 MG/DL (ref 0–200)
CO2 SERPL-SCNC: 24.9 MMOL/L (ref 22–29)
CREAT SERPL-MCNC: 0.54 MG/DL (ref 0.57–1)
GLOBULIN SER CALC-MCNC: 1.5 GM/DL
GLUCOSE SERPL-MCNC: 83 MG/DL (ref 65–99)
HBA1C MFR BLD: 4.7 % (ref 4.8–5.6)
HDLC SERPL-MCNC: 66 MG/DL (ref 40–60)
INTERPRETATION: NORMAL
LDLC SERPL CALC-MCNC: 54 MG/DL (ref 0–100)
Lab: NORMAL
MICROALBUMIN UR-MCNC: 15.6 UG/ML
POTASSIUM SERPL-SCNC: 4.2 MMOL/L (ref 3.5–5.2)
PROT SERPL-MCNC: 6.5 G/DL (ref 6–8.5)
SODIUM SERPL-SCNC: 142 MMOL/L (ref 136–145)
T4 SERPL-MCNC: 7.7 MCG/DL (ref 4.5–11.7)
TRIGL SERPL-MCNC: 115 MG/DL (ref 0–150)
TSH SERPL DL<=0.005 MIU/L-ACNC: 1.41 UIU/ML (ref 0.27–4.2)
URATE SERPL-MCNC: 3.8 MG/DL (ref 2.4–5.7)
VLDLC SERPL CALC-MCNC: 23 MG/DL

## 2020-10-05 RX ORDER — ERGOCALCIFEROL 1.25 MG/1
50000 CAPSULE ORAL WEEKLY
Qty: 13 CAPSULE | Refills: 0 | Status: SHIPPED | OUTPATIENT
Start: 2020-10-05 | End: 2021-01-03

## 2020-10-05 RX ORDER — ERGOCALCIFEROL 1.25 MG/1
50000 CAPSULE ORAL WEEKLY
COMMUNITY
End: 2020-10-05 | Stop reason: SDUPTHER

## 2020-10-16 RX ORDER — EMPAGLIFLOZIN, METFORMIN HYDROCHLORIDE 12.5; 1 MG/1; MG/1
2 TABLET, EXTENDED RELEASE ORAL DAILY
Qty: 180 TABLET | Refills: 0 | Status: SHIPPED | OUTPATIENT
Start: 2020-10-16 | End: 2021-01-07 | Stop reason: SDUPTHER

## 2020-11-16 RX ORDER — LANCETS 33 GAUGE
EACH MISCELLANEOUS
Qty: 200 EACH | Refills: 2 | Status: SHIPPED | OUTPATIENT
Start: 2020-11-16 | End: 2021-10-25

## 2020-12-16 RX ORDER — BLOOD SUGAR DIAGNOSTIC
STRIP MISCELLANEOUS
Qty: 200 EACH | Refills: 1 | Status: SHIPPED | OUTPATIENT
Start: 2020-12-16 | End: 2021-06-18 | Stop reason: SDUPTHER

## 2020-12-28 RX ORDER — DIGOXIN 250 MCG
250 TABLET ORAL DAILY
Qty: 30 TABLET | Refills: 3 | Status: SHIPPED | OUTPATIENT
Start: 2020-12-28 | End: 2021-04-08 | Stop reason: SDUPTHER

## 2021-01-03 RX ORDER — ERGOCALCIFEROL 1.25 MG/1
CAPSULE ORAL
Qty: 13 CAPSULE | Refills: 0 | Status: SHIPPED | OUTPATIENT
Start: 2021-01-03 | End: 2021-04-06

## 2021-01-06 NOTE — PROGRESS NOTES
"  Santiago Dunlap is a 70 y.o. female. fup type 2 diabetes w/o complications checks bs bid   Chief Complaint   Patient presents with   • Diabetes     fup type 2 diabetes      /70   Pulse 69   Resp 20   Ht 170.2 cm (67\")   Wt 83.7 kg (184 lb 9.6 oz)   SpO2 99%   BMI 28.91 kg/m²     History of Present Illness   Encounter Diagnoses   Name Primary?   • Type 2 diabetes mellitus without complication, without long-term current use of insulin (CMS/HCC) Yes   • Vitamin D deficiency    • Hyperlipidemia, unspecified hyperlipidemia type    • Abnormal weight gain    70-year-old female patient here today for follow-up visit.  She has been seen for the above-mentioned problems.  She does have some neuropathy in her toes.  Her foot exam was performed at today's visit.  She [facility].  She is current on her eye exam.  She checks her blood sugars twice daily.  Her blood sugars are in satisfactory range.  She is exercising with walking on a treadmill.  She is on statin therapy for her cholesterol.  She has not had any recent labs done.  She does have some stress incontinence which states causes increased urination plus being on Synjardy.  She is taking her medications as prescribed.  She has not had recent labs will have labs following today's visit.  She has had 14 pounds of weight gain since her last visit as a result of stress eating.  She is trying to be more mindful of her diet and exercise      The following portions of the patient's history were reviewed and updated as appropriate:   Past Medical History:   Diagnosis Date   • Diabetes mellitus (CMS/HCC)    • Glaucoma    • Hyperlipidemia    • Pancreatitis    • SVT (supraventricular tachycardia) (CMS/HCC)    • Type 2 diabetes mellitus (CMS/HCC)      Past Surgical History:   Procedure Laterality Date   • CHOLECYSTECTOMY     • TOTAL ABDOMINAL HYSTERECTOMY       Family History   Problem Relation Age of Onset   • Heart " attack Mother    • Glaucoma Mother    • Arrhythmia Father    • Angina Father    • Glaucoma Sister    • Breast cancer Sister    • Colon cancer Brother      OB History    No obstetric history on file.       Current Outpatient Medications   Medication Sig Dispense Refill   • digoxin (LANOXIN) 250 MCG tablet TAKE 1 TABLET BY MOUTH DAILY 30 tablet 3   • latanoprost (XALATAN) 0.005 % ophthalmic solution INT 1 GTT IN OU Q NIGHT     • Multiple Vitamins-Minerals (MULTIVITAMIN WOMEN 50+) tablet Take  by mouth.     • multivitamins-minerals (PRESERVISION AREDS 2) capsule capsule Take 1 capsule by mouth 2 (Two) Times a Day.     • OneTouch Delica Lancets 33G misc USE AS DIRECTED TWICE DAILY 200 each 2   • OneTouch Verio test strip USE TO TEST BLOOD GLUCOSE TWICE DAILY AS DIRECTED 200 each 1   • rosuvastatin (CRESTOR) 20 MG tablet TAKE 1 TABLET BY MOUTH EVERY NIGHT 90 tablet 3   • Synjardy XR 12.5-1000 MG tablet sustained-release 24 hour Take 2 tablets by mouth Daily. 180 tablet 0   • vitamin D (ERGOCALCIFEROL) 1.25 MG (26853 UT) capsule capsule TAKE 1 CAPSULE BY MOUTH 1 TIME EVERY WEEK 13 capsule 0     No current facility-administered medications for this visit.      Allergies   Allergen Reactions   • Erythromycin    • Erythromycin Base    • Penicillins      Social History     Socioeconomic History   • Marital status:      Spouse name: Not on file   • Number of children: Not on file   • Years of education: Not on file   • Highest education level: Not on file   Tobacco Use   • Smoking status: Never Smoker   • Smokeless tobacco: Never Used   Substance and Sexual Activity   • Alcohol use: No   • Drug use: No   • Sexual activity: Defer     Birth control/protection: Surgical       Review of Systems  Review of Systems   Constitutional: Positive for unexpected weight change (wt gain ).   HENT: Negative.    Eyes: Negative.    Respiratory: Negative.    Cardiovascular: Negative.    Gastrointestinal: Negative.    Endocrine: Positive  "for polyphagia. Negative for polydipsia and polyuria.        Stress eater   Genitourinary: Positive for frequency.        Stress incontinence   Musculoskeletal: Negative.    Skin: Negative.    Allergic/Immunologic: Negative.    Neurological: Positive for numbness.   Hematological: Negative.    Psychiatric/Behavioral: Negative.         Objective    /70   Pulse 69   Resp 20   Ht 170.2 cm (67\")   Wt 83.7 kg (184 lb 9.6 oz)   SpO2 99%   BMI 28.91 kg/m²     Physical Exam  Vitals signs and nursing note reviewed.   Constitutional:       General: She is not in acute distress.     Appearance: Normal appearance. She is well-developed. She is not diaphoretic.   HENT:      Head: Normocephalic and atraumatic.      Right Ear: External ear normal.      Left Ear: External ear normal.      Nose: Nose normal.      Mouth/Throat:      Pharynx: No oropharyngeal exudate.   Eyes:      General:         Right eye: No discharge.         Left eye: No discharge.      Pupils: Pupils are equal, round, and reactive to light.   Neck:      Musculoskeletal: Full passive range of motion without pain, normal range of motion and neck supple. No edema or erythema.      Thyroid: No thyroid mass or thyromegaly.      Vascular: No carotid bruit.      Trachea: Trachea normal. No tracheal tenderness or tracheal deviation.   Cardiovascular:      Rate and Rhythm: Normal rate and regular rhythm.      Heart sounds: Normal heart sounds. No murmur. No friction rub. No gallop.    Pulmonary:      Effort: Pulmonary effort is normal. No respiratory distress.      Breath sounds: Normal breath sounds. No stridor. No wheezing or rales.   Abdominal:      General: Bowel sounds are normal. There is no distension.      Palpations: Abdomen is soft.   Musculoskeletal: Normal range of motion.         General: No deformity.   Feet:      Right foot:      Skin integrity: Skin integrity normal.      Left foot:      Skin integrity: Skin integrity normal.      Comments: " Hammer toes some toenail thinkness  Lymphadenopathy:      Cervical: No cervical adenopathy.   Skin:     General: Skin is warm and dry.      Coloration: Skin is not pale.      Findings: No erythema or rash.   Neurological:      Mental Status: She is alert and oriented to person, place, and time.   Psychiatric:         Behavior: Behavior normal.         Thought Content: Thought content normal.         Judgment: Judgment normal.         Lab Review  Glucose (mg/dL)   Date Value   06/26/2020 88   07/23/2019 91   07/20/2018 221 (H)     Sodium (mmol/L)   Date Value   08/31/2020 142   06/26/2020 136   03/02/2020 141   09/03/2019 142   07/23/2019 139   07/20/2018 139     Potassium (mmol/L)   Date Value   08/31/2020 4.2   06/26/2020 4.0   03/02/2020 4.4   09/03/2019 4.4   07/23/2019 4.6   07/20/2018 4.7     Chloride (mmol/L)   Date Value   08/31/2020 104   06/26/2020 101   03/02/2020 99   09/03/2019 100   07/23/2019 100   07/20/2018 100     CO2 (mmol/L)   Date Value   06/26/2020 25.0   07/23/2019 27.2   07/20/2018 24.8     Total CO2 (mmol/L)   Date Value   08/31/2020 24.9   03/02/2020 26.1   09/03/2019 27.6     BUN (mg/dL)   Date Value   08/31/2020 17   06/26/2020 17   03/02/2020 13   09/03/2019 12   07/23/2019 18   07/20/2018 14     Creatinine (mg/dL)   Date Value   08/31/2020 0.54 (L)   06/26/2020 0.55 (L)   03/02/2020 0.67   09/03/2019 0.64   07/23/2019 0.64   07/20/2018 0.70     Hemoglobin A1C (%)   Date Value   08/31/2020 4.70 (L)   03/02/2020 5.00   09/03/2019 5.10     Triglycerides (mg/dL)   Date Value   08/31/2020 115   06/26/2020 113   03/02/2020 104   09/03/2019 118   07/23/2019 156 (H)   07/20/2018 238 (H)     LDL Cholesterol  (mg/dL)   Date Value   06/26/2020 61   07/23/2019 53   01/30/2019 53   07/20/2018 90     LDL Chol Calc (NIH) (mg/dL)   Date Value   08/31/2020 54       Assessment/Plan      1. Type 2 diabetes mellitus without complication, without long-term current use of insulin (CMS/[facility])    2. Vitamin D  deficiency    3. Hyperlipidemia, unspecified hyperlipidemia type    .    Medications prescribed:  Outpatient Encounter Medications as of 1/7/2021   Medication Sig Dispense Refill   • digoxin (LANOXIN) 250 MCG tablet TAKE 1 TABLET BY MOUTH DAILY 30 tablet 3   • latanoprost (XALATAN) 0.005 % ophthalmic solution INT 1 GTT IN OU Q NIGHT     • Multiple Vitamins-Minerals (MULTIVITAMIN WOMEN 50+) tablet Take  by mouth.     • multivitamins-minerals (PRESERVISION AREDS 2) capsule capsule Take 1 capsule by mouth 2 (Two) Times a Day.     • OneTouch Delica Lancets 33G misc USE AS DIRECTED TWICE DAILY 200 each 2   • OneTouch Verio test strip USE TO TEST BLOOD GLUCOSE TWICE DAILY AS DIRECTED 200 each 1   • rosuvastatin (CRESTOR) 20 MG tablet TAKE 1 TABLET BY MOUTH EVERY NIGHT 90 tablet 3   • Synjardy XR 12.5-1000 MG tablet sustained-release 24 hour Take 2 tablets by mouth Daily. 180 tablet 0   • vitamin D (ERGOCALCIFEROL) 1.25 MG (58129 UT) capsule capsule TAKE 1 CAPSULE BY MOUTH 1 TIME EVERY WEEK 13 capsule 0     No facility-administered encounter medications on file as of 1/7/2021.        Orders placed during this encounter include:  No orders of the defined types were placed in this encounter.    Patient was seen and examined.  Her previous labs were reviewed and she will have extensive labs following today's visit.  She will be notified of the results along with any further changes.  No medications have been added or change.  Her refills have been sent to her pharmacy per her request.  She has been counseled on diet exercise for her 14 pounds of weight gain.  Metabolically and clinically she presents stable.  She does have slight neuropathy in both feet but it is not unmanageable at this time.  She is up-to-date on her eye exam.  She will follow-up in 6 months with labs.

## 2021-01-07 ENCOUNTER — OFFICE VISIT (OUTPATIENT)
Dept: ENDOCRINOLOGY | Age: 71
End: 2021-01-07

## 2021-01-07 VITALS
RESPIRATION RATE: 20 BRPM | BODY MASS INDEX: 28.97 KG/M2 | OXYGEN SATURATION: 99 % | WEIGHT: 184.6 LBS | DIASTOLIC BLOOD PRESSURE: 70 MMHG | HEART RATE: 69 BPM | SYSTOLIC BLOOD PRESSURE: 124 MMHG | HEIGHT: 67 IN

## 2021-01-07 DIAGNOSIS — E55.9 VITAMIN D DEFICIENCY: ICD-10-CM

## 2021-01-07 DIAGNOSIS — E11.42 DIABETIC PERIPHERAL NEUROPATHY (HCC): ICD-10-CM

## 2021-01-07 DIAGNOSIS — R63.5 ABNORMAL WEIGHT GAIN: ICD-10-CM

## 2021-01-07 DIAGNOSIS — E78.5 HYPERLIPIDEMIA, UNSPECIFIED HYPERLIPIDEMIA TYPE: ICD-10-CM

## 2021-01-07 DIAGNOSIS — E11.9 TYPE 2 DIABETES MELLITUS WITHOUT COMPLICATION, WITHOUT LONG-TERM CURRENT USE OF INSULIN (HCC): Primary | ICD-10-CM

## 2021-01-07 PROCEDURE — 99214 OFFICE O/P EST MOD 30 MIN: CPT | Performed by: NURSE PRACTITIONER

## 2021-01-07 RX ORDER — EMPAGLIFLOZIN, METFORMIN HYDROCHLORIDE 12.5; 1 MG/1; MG/1
2 TABLET, EXTENDED RELEASE ORAL DAILY
Qty: 180 TABLET | Refills: 1 | Status: SHIPPED | OUTPATIENT
Start: 2021-01-07 | End: 2021-07-18 | Stop reason: SDUPTHER

## 2021-01-07 RX ORDER — ROSUVASTATIN CALCIUM 20 MG/1
20 TABLET, COATED ORAL NIGHTLY
Qty: 90 TABLET | Refills: 3 | Status: SHIPPED | OUTPATIENT
Start: 2021-01-07 | End: 2021-08-17 | Stop reason: SDUPTHER

## 2021-01-08 LAB
25(OH)D3+25(OH)D2 SERPL-MCNC: 50.7 NG/ML (ref 30–100)
ALBUMIN SERPL-MCNC: 5 G/DL (ref 3.5–5.2)
ALBUMIN/GLOB SERPL: 2.3 G/DL
ALP SERPL-CCNC: 63 U/L (ref 39–117)
ALT SERPL-CCNC: 17 U/L (ref 1–33)
AST SERPL-CCNC: 21 U/L (ref 1–32)
BILIRUB SERPL-MCNC: 0.4 MG/DL (ref 0–1.2)
BUN SERPL-MCNC: 12 MG/DL (ref 8–23)
BUN/CREAT SERPL: 19.4 (ref 7–25)
C PEPTIDE SERPL-MCNC: 3.7 NG/ML (ref 1.1–4.4)
CALCIUM SERPL-MCNC: 10.2 MG/DL (ref 8.6–10.5)
CHLORIDE SERPL-SCNC: 104 MMOL/L (ref 98–107)
CHOLEST SERPL-MCNC: 173 MG/DL (ref 0–200)
CO2 SERPL-SCNC: 26.4 MMOL/L (ref 22–29)
CREAT SERPL-MCNC: 0.62 MG/DL (ref 0.57–1)
GLOBULIN SER CALC-MCNC: 2.2 GM/DL
GLUCOSE SERPL-MCNC: 97 MG/DL (ref 65–99)
HBA1C MFR BLD: 5.3 % (ref 4.8–5.6)
HDLC SERPL-MCNC: 67 MG/DL (ref 40–60)
INTERPRETATION: NORMAL
LDLC SERPL CALC-MCNC: 74 MG/DL (ref 0–100)
Lab: NORMAL
POTASSIUM SERPL-SCNC: 4.3 MMOL/L (ref 3.5–5.2)
PROT SERPL-MCNC: 7.2 G/DL (ref 6–8.5)
SODIUM SERPL-SCNC: 141 MMOL/L (ref 136–145)
T4 FREE SERPL-MCNC: 1.32 NG/DL (ref 0.93–1.7)
TRIGL SERPL-MCNC: 194 MG/DL (ref 0–150)
TSH SERPL DL<=0.005 MIU/L-ACNC: 1.94 UIU/ML (ref 0.27–4.2)
VLDLC SERPL CALC-MCNC: 32 MG/DL (ref 5–40)

## 2021-03-22 ENCOUNTER — TELEPHONE (OUTPATIENT)
Dept: CARDIOLOGY | Facility: CLINIC | Age: 71
End: 2021-03-22

## 2021-03-22 NOTE — TELEPHONE ENCOUNTER
Will work on PA   Also informed pt of [facility] WG 22.20 #30, 56.40  # 90  lima 20.60  #90     Pt states she has enough at this time while I work on PA

## 2021-03-22 NOTE — TELEPHONE ENCOUNTER
----- Message from Stephon Downs Rep sent at 3/22/2021 12:56 PM EDT -----  Regarding: PA for digoxin  Pt said she was just speaking with someone but can't remember with who. It was about Digoxin, her insurance coverage and needing PA for a prescription.    She said she would call back with these numbers from [place]...    [phone] (PA dept.)    Fax: [phone]

## 2021-04-06 RX ORDER — ERGOCALCIFEROL 1.25 MG/1
CAPSULE ORAL
Qty: 12 CAPSULE | Refills: 0 | Status: SHIPPED | OUTPATIENT
Start: 2021-04-06 | End: 2021-08-17 | Stop reason: SDUPTHER

## 2021-04-08 RX ORDER — DIGOXIN 250 MCG
250 TABLET ORAL DAILY
Qty: 30 TABLET | Refills: 3 | Status: SHIPPED | OUTPATIENT
Start: 2021-04-08 | End: 2021-08-10 | Stop reason: SDUPTHER

## 2021-06-18 RX ORDER — BLOOD SUGAR DIAGNOSTIC
STRIP MISCELLANEOUS
Qty: 200 EACH | Refills: 1 | Status: SHIPPED | OUTPATIENT
Start: 2021-06-18 | End: 2021-12-20

## 2021-06-25 DIAGNOSIS — E78.5 HYPERLIPIDEMIA, UNSPECIFIED HYPERLIPIDEMIA TYPE: Primary | ICD-10-CM

## 2021-06-25 DIAGNOSIS — I47.1 SUPRAVENTRICULAR TACHYCARDIA (HCC): ICD-10-CM

## 2021-06-25 DIAGNOSIS — I35.9 AORTIC VALVE DISEASE: ICD-10-CM

## 2021-06-25 DIAGNOSIS — R03.0 BORDERLINE SYSTOLIC HTN: ICD-10-CM

## 2021-06-25 DIAGNOSIS — R01.1 MURMUR: ICD-10-CM

## 2021-07-19 RX ORDER — EMPAGLIFLOZIN, METFORMIN HYDROCHLORIDE 12.5; 1 MG/1; MG/1
2 TABLET, EXTENDED RELEASE ORAL DAILY
Qty: 180 TABLET | Refills: 1 | Status: SHIPPED | OUTPATIENT
Start: 2021-07-19 | End: 2021-10-12 | Stop reason: SDUPTHER

## 2021-07-20 ENCOUNTER — MEDICATION THERAPY MANAGEMENT (OUTPATIENT)
Dept: ENDOCRINOLOGY | Age: 71
End: 2021-07-20

## 2021-07-20 ENCOUNTER — OFFICE VISIT (OUTPATIENT)
Dept: ENDOCRINOLOGY | Age: 71
End: 2021-07-20

## 2021-07-20 VITALS
WEIGHT: 194 LBS | DIASTOLIC BLOOD PRESSURE: 70 MMHG | BODY MASS INDEX: 30.45 KG/M2 | HEIGHT: 67 IN | SYSTOLIC BLOOD PRESSURE: 126 MMHG

## 2021-07-20 DIAGNOSIS — E11.9 TYPE 2 DIABETES MELLITUS WITHOUT COMPLICATION, WITHOUT LONG-TERM CURRENT USE OF INSULIN (HCC): Primary | ICD-10-CM

## 2021-07-20 DIAGNOSIS — E78.5 DYSLIPIDEMIA: ICD-10-CM

## 2021-07-20 PROCEDURE — 99214 OFFICE O/P EST MOD 30 MIN: CPT | Performed by: NURSE PRACTITIONER

## 2021-07-20 NOTE — PROGRESS NOTES
MTM-[facility]M Pharmacy Visit [facility]    Delaney Dunlap is a 71 y.o. female seen by Endocrinology and assessed by the [facility] Pharmacist at [facility].  This was an initial MTM visit for Delaney Dunlap.    Delaney Dunlap was introduced to the [facility] and her allergies, PMH, and medications were reviewed.       Past Medical History:   Diagnosis Date   • Diabetes mellitus (CMS/[facility])    • Glaucoma    • Hyperlipidemia    • Pancreatitis    • SVT (supraventricular tachycardia) (CMS/[facility])    • Type 2 diabetes mellitus (CMS/[facility])      Social History     Socioeconomic History   • Marital status:      Spouse name: Not on file   • Number of children: Not on file   • Years of education: Not on file   • Highest education level: Not on file   Tobacco Use   • Smoking status: Never Smoker   • Smokeless tobacco: Never Used   Substance and Sexual Activity   • Alcohol use: No   • Drug use: No   • Sexual activity: Defer     Birth control/protection: Surgical       Medication Allergies:  Erythromycin, Erythromycin base, and Penicillins        Current Outpatient Medications:   •  digoxin (LANOXIN) 250 MCG tablet, Take 1 tablet by mouth Daily., Disp: 30 tablet, Rfl: 3  •  latanoprost (XALATAN) 0.005 % ophthalmic solution, INT 1 GTT IN OU Q NIGHT, Disp: , Rfl:   •  Multiple Vitamins-Minerals (MULTIVITAMIN WOMEN 50+) tablet, Take  by mouth., Disp: , Rfl:   •  multivitamins-minerals (PRESERVISION AREDS 2) capsule capsule, Take 1 capsule by mouth 2 (Two) Times a Day., Disp: , Rfl:   •  OneTouch Delica Lancets 33G misc, USE AS DIRECTED TWICE DAILY, Disp: 200 each, Rfl: 2  •  OneTouch Verio test strip, Dx code E11.9 USE TO TEST BLOOD GLUCOSE TWICE DAILY AS DIRECTED, Disp: 200 each, Rfl: 1  •  rosuvastatin (CRESTOR) 20 MG tablet, Take 1 tablet by mouth Every Night., Disp: 90 tablet, Rfl: 3  •  Synjardy XR 12.5-1000 MG tablet sustained-release 24  hour, Take 2 tablets by mouth Daily., Disp: 180 tablet, Rfl: 1  •  vitamin D (ERGOCALCIFEROL) 1.25 MG (71541 UT) capsule capsule, TAKE 1 CAPSULE BY MOUTH 1 TIME EVERY WEEK, Disp: 12 capsule, Rfl: 0      Labs:  There were no vitals filed for this visit.  There were no vitals filed for this visit.  Lab Results   Component Value Date    HGBA1C 5.30 01/07/2021     Lab Results   Component Value Date    GLUCOSE 88 06/26/2020    CALCIUM 10.2 01/07/2021     01/07/2021    K 4.3 01/07/2021    CO2 26.4 01/07/2021     01/07/2021    BUN 12 01/07/2021    CREATININE 0.62 01/07/2021    EGFRIFAFRI 116 01/07/2021    EGFRIFNONA 95 01/07/2021    BCR 19.4 01/07/2021    ANIONGAP 10.0 06/26/2020     Lab Results   Component Value Date    CHOL 154 06/26/2020    CHLPL 173 01/07/2021    TRIG 194 (H) 01/07/2021    HDL 67 (H) 01/07/2021    LDL 74 01/07/2021         Drug-Drug Interactions:   No significant DDIs were noted        Medication Assessment:   1. Aspirin - Not Indicated   2. Statin - Currently Taking  3. ACEi/ARB - Not currently taking      Medication Education on Specialty Medication:  Synjardy XR  • Discussed the medication's MOA and that it may cause more frequent urination particularly upon starting the medication  • Take one tablet in the morning with or without food    • Encouraged to drink plenty of water as to not get dehydrated especially in warmer weather or with activity  • Also discussed there may be an increased incidence of UTIs or yeast infections and to monitor for signs/symptoms  • Encouraged to take with food as it may cause N/V/D if taken on empty stomach        Assessment / Plan:  1. The patient was provided medication education via verbal information. Patient expressed understanding and had no further questions at this time.  2. Consider adding an ACEi or ARB in the future.  3. Discussed the [facility] pharmacy services that are available to her, including monitoring of refills, prior authorizations,  and copay coupon cards, as applicable, as well as curb-side pick-up or mail-order as needed.  o Will follow-up in about 3 months to test price of Synjardy here to see if it's any cheaper than her current price.  4. Contact information for the pharmacy team was provided to the patient.        Evelyn Melton, Gloria  7/20/2021  16:38 EDT

## 2021-07-20 NOTE — PATIENT INSTRUCTIONS
Empagliflozin; Metformin oral tablets  What is this medicine?  EMPAGLIFLOZIN; METFORMIN (EM pa gli FLOE zin; met FOR min) is a combination of 2 medicines used to treat type 2 diabetes. This medicine lowers blood sugar. Treatment is combined with a balanced diet and exercise.  This medicine may be used for other purposes; ask your health care provider or pharmacist if you have questions.  COMMON BRAND NAME(S): Synjardy  What should I tell my health care provider before I take this medicine?  They need to know if you have any of these conditions:  · anemia  · dehydration  · diabetic ketoacidosis  · diet low in salt  · eating less due to illness, surgery, dieting, or any other reason  · having surgery  · heart disease  · high cholesterol  · history of pancreatitis or pancreas problems  · history of yeast infection of the penis or vagina  · if you often drink alcohol  · infections in the bladder, kidneys, or urinary tract  · kidney disease  · liver disease  · low blood pressure  · polycystic ovary syndrome  · problems urinating  · serious infection or injury  · type 1 diabetes  · uncircumcised male  · vomiting  · an unusual or allergic reaction to empagliflozin, metformin, other medicines, foods, dyes, or preservatives  · pregnant or trying to get pregnant  · breast-feeding  How should I use this medicine?  Take this medicine by mouth with a glass of water. Take this medicine with food. Follow the directions on the prescription label. Take your doses at regular intervals. Do not take your medicine more often than directed. Do not stop taking except on your doctor's advice.  A special MedGuide will be given to you by the pharmacist with each prescription and refill. Be sure to read this information carefully each time.  Talk to your pediatrician regarding the use of this medicine in children. Special care may be needed.  Overdosage: If you think you have taken too much of this medicine contact a poison control center or  emergency room at once.  NOTE: This medicine is only for you. Do not share this medicine with others.  What if I miss a dose?  If you miss a dose, take it as soon as you can. If it is almost time for your next dose, take only that dose. Do not take double or extra doses.  What may interact with this medicine?  Do not take this medicine with any of the following medications:  · certain contrast medicines given before X-rays, CT scans, MRI, or other procedures  · dofetilide  This medicine may also interact with the following medications:  · acetazolamide  · alcohol  · certain antivirals for HIV or hepatitis  · certain medicines for blood pressure, heart disease, irregular heart beat  · cimetidine  · dichlorphenamide  · digoxin  · diuretics  · female hormones, like estrogens or progestins and birth control pills  · glycopyrrolate  · isoniazid  · lamotrigine  · memantine  · methazolamide  · metoclopramide  · midodrine  · niacin  · phenothiazines like chlorpromazine, mesoridazine, prochlorperazine, thioridazine  · phenytoin  · ranolazine  · steroid medicines like prednisone or cortisone  · stimulant medicines for attention disorders, weight loss, or to stay awake  · thyroid medicines  · topiramate  · trospium  · vandetanib  · zonisamide  This list may not describe all possible interactions. Give your health care provider a list of all the medicines, herbs, non-prescription drugs, or dietary supplements you use. Also tell them if you smoke, drink alcohol, or use illegal drugs. Some items may interact with your medicine.  What should I watch for while using this medicine?  Visit your doctor or health care professional for regular checks on your progress.  This medicine can cause a serious condition in which there is too much acid in the blood. If you develop nausea, vomiting, stomach pain, unusual tiredness, or breathing problems, stop taking this medicine and call your doctor right away. If possible, use a ketone dipstick  to check for ketones in your urine.  A test called the HbA1C (A1C) will be monitored. This is a simple blood test. It measures your blood sugar control over the last 2 to 3 months. You will receive this test every 3 to 6 months.  Learn how to check your blood sugar. Learn the symptoms of low and high blood sugar and how to manage them.  Always carry a quick-source of sugar with you in case you have symptoms of low blood sugar. Examples include hard sugar candy or glucose tablets. Make sure others know that you can choke if you eat or drink when you develop serious symptoms of low blood sugar, such as seizures or unconsciousness. They must get medical help at once.  Tell your doctor or health care professional if you have high blood sugar. You might need to change the dose of your medicine. If you are sick or exercising more than usual, you might need to change the dose of your medicine.  Do not skip meals. Ask your doctor or health care professional if you should avoid alcohol. Many nonprescription cough and cold products contain sugar or alcohol. These can affect blood sugar.  This medicine may cause ovulation in premenopausal women who do not have regular monthly periods. This may increase your chances of becoming pregnant. You should not take this medicine if you become pregnant or think you may be pregnant. Talk with your doctor or health care professional about your birth control options while taking this medicine. Contact your doctor or health care professional right away if you think you are pregnant.  If you are going to need surgery, a MRI, CT scan, or other procedure, tell your doctor that you are taking this medicine. You may need to stop taking this medicine before the procedure.  Wear a medical ID bracelet or chain, and carry a card that describes your disease and details of your medicine and dosage times.  This medicine may cause a decrease in folic acid and vitamin B12. You should make sure that you  get enough vitamins while you are taking this medicine. Discuss the foods you eat and the vitamins you take with your health care professional.  What side effects may I notice from receiving this medicine?  Side effects that you should report to your doctor or health care professional as soon as possible:  · allergic reactions like skin rash, itching or hives, swelling of the face, lips, or tongue  · breathing problems  · dizziness  · feeling faint or lightheaded, falls  · muscle aches or pains  · muscle weakness  · penile discharge, itching, or pain in men  · signs and symptoms of a genital infection, such as fever; tenderness, redness, or swelling in the genitals or area from the genitals to the back of the rectum  · signs and symptoms of low blood sugar such as feeling anxious, confusion, dizziness, increased hunger, unusually weak or tired, sweating, shakiness, cold, irritable, headache, blurred vision, fast heartbeat, loss of consciousness  · signs and symptoms of a urinary tract infection, such as fever, chills, a burning feeling when urinating, blood in the urine, back pain  · trouble passing urine or change in the amount of urine, including an urgent need to urinate more often, in larger amounts, or at night  · slow or irregular heartbeat  · unusual stomach pain or discomfort  · unusually tired or weak  · vaginal discharge, itching, or odor in women  · vomiting  Side effects that usually do not require medical attention (report to your doctor or health care professional if they continue or are bothersome):  · diarrhea  · headache  · heartburn  · metallic taste in mouth  · mild increase in urination  · nausea  · stomach gas, upset  · thirsty  This list may not describe all possible side effects. Call your doctor for medical advice about side effects. You may report side effects to FDA at 9-587-LFK-8966.  Where should I keep my medicine?  Keep out of the reach of children.  Store at room temperature between 15  and 30 degrees C (59 and 86 degrees F). Throw away any unused medicine after the expiration date.  NOTE: This sheet is a summary. It may not cover all possible information. If you have questions about this medicine, talk to your doctor, pharmacist, or health care provider.  © 2021 [employer] (2019-01-25 11:56:31)

## 2021-07-20 NOTE — PROGRESS NOTES
"Chief Complaint  Diabetes    Subjective          Delaney Dunlap presents to [facility] ENDOCRINOLOGY  History of Present Illness     Type 2 dm   Diagnosed around 2017  Today in clinic pt reports being on synjardy BID   FBG - 110s  Pre meals - 90-110s  Checks BG - BID  Dm retinopathy - denies ,Last eye exam - UTD 2021  Dm nephropathy - denies  Dm neuropathy - mild ,Dm neuropathy meds -   CAD - yes- carotid artery disease  CVA - denies  Episodes of hypoglycemia - denies  Pt is physically active. weight has been stable.   Pt tries to follow DM diet for most part.     Lab Results   Component Value Date    HGBA1C 5.30 07/20/2021         HLP  On statin  Lab Results   Component Value Date    CHOL 154 06/26/2020    CHLPL 173 01/07/2021    TRIG 194 (H) 01/07/2021    HDL 67 (H) 01/07/2021    LDL 74 01/07/2021         Objective   Vital Signs:   /70 (BP Location: Right arm, Patient Position: Sitting, Cuff Size: Adult)   Ht 170.2 cm (67.01\")   Wt 88 kg (194 lb)   BMI 30.38 kg/m²     Physical Exam  Vitals reviewed.   Constitutional:       General: She is not in acute distress.  HENT:      Head: Normocephalic and atraumatic.   Cardiovascular:      Rate and Rhythm: Normal rate and regular rhythm.   Pulmonary:      Effort: Pulmonary effort is normal. No respiratory distress.   Musculoskeletal:         General: No signs of injury. Normal range of motion.      Cervical back: Normal range of motion and neck supple.   Skin:     General: Skin is warm and dry.   Neurological:      Mental Status: She is alert and oriented to person, place, and time. Mental status is at baseline.   Psychiatric:         Mood and Affect: Mood normal.         Behavior: Behavior normal.         Thought Content: Thought content normal.         Judgment: Judgment normal.        Result Review :   The following data was reviewed by: MEHUL Francis on 07/20/2021:  Common labs    Common Labsle 8/31/20 8/31/20 8/31/20 8/31/20 " 8/31/20 1/7/21 1/7/21 1/7/21 7/20/21    0959 0959 0959 0959 0959 1045 1045 1045    Glucose     83 97      BUN     17 12      Creatinine     0.54 (A) 0.62      eGFR Non  Am     112 95      eGFR  Am     136 116      Sodium     142 141      Potassium     4.2 4.3      Chloride     104 104      Calcium     9.7 10.2      Total Protein     6.5 7.2      Albumin     5.00 5.00      Total Bilirubin     0.4 0.4      Alkaline Phosphatase     48 63      AST (SGOT)     15 21      ALT (SGPT)     14 17      Total Cholesterol   143     173    Triglycerides   115     194 (A)    HDL Cholesterol   66 (A)     67 (A)    LDL Cholesterol    54     74    Hemoglobin A1C    4.70 (A)   5.30  5.30   Microalbumin, Urine  15.6          Uric Acid 3.8           (A) Abnormal value       Comments are available for some flowsheets but are not being displayed.                     Assessment and Plan    Diagnoses and all orders for this visit:    1. Type 2 diabetes mellitus without complication, without long-term current use of insulin (CMS/[facility]) (Primary)  -     Hemoglobin A1c  -     Hemoglobin A1c; Future  -     Comprehensive Metabolic Panel; Future  -     Lipid Panel; Future  -     Microalbumin / Creatinine Urine Ratio - Urine, Clean Catch; Future    2. Dyslipidemia  -     Comprehensive Metabolic Panel; Future  -     Lipid Panel; Future        Follow Up   Return in about 6 months (around 1/20/2022).   Continue synjardy- improve urinary hygiene to minimize risk of yeast infections   On statin  Yearly diabetic eye exams  Daily diabetic foot care  Diabetic diet  Exercise as tolerated     Patient was given instructions and counseling regarding her condition or for health maintenance advice. Please see specific information pulled into the AVS if appropriate.     MEHUL Francis

## 2021-07-21 LAB — HBA1C MFR BLD: 5.3 % (ref 4.8–5.6)

## 2021-08-03 ENCOUNTER — HOSPITAL ENCOUNTER (OUTPATIENT)
Dept: CARDIOLOGY | Facility: HOSPITAL | Age: 71
Discharge: HOME | End: 2021-08-03
Admitting: INTERNAL MEDICINE

## 2021-08-03 DIAGNOSIS — I47.1 SUPRAVENTRICULAR TACHYCARDIA (HCC): ICD-10-CM

## 2021-08-03 DIAGNOSIS — E78.5 HYPERLIPIDEMIA, UNSPECIFIED HYPERLIPIDEMIA TYPE: ICD-10-CM

## 2021-08-03 DIAGNOSIS — I35.9 AORTIC VALVE DISEASE: ICD-10-CM

## 2021-08-03 DIAGNOSIS — R03.0 BORDERLINE SYSTOLIC HTN: ICD-10-CM

## 2021-08-03 DIAGNOSIS — R01.1 MURMUR: ICD-10-CM

## 2021-08-03 LAB
ALBUMIN SERPL-MCNC: 4.8 G/DL (ref 3.5–5.2)
ALBUMIN/GLOB SERPL: 2.2 G/DL
ALP SERPL-CCNC: 58 U/L (ref 39–117)
ALT SERPL W P-5'-P-CCNC: 21 U/L (ref 1–33)
ANION GAP SERPL CALCULATED.3IONS-SCNC: 11.4 MMOL/L (ref 5–15)
AST SERPL-CCNC: 18 U/L (ref 1–32)
BILIRUB SERPL-MCNC: 0.5 MG/DL (ref 0–1.2)
BUN SERPL-MCNC: 13 MG/DL (ref 8–23)
BUN/CREAT SERPL: 22.8 (ref 7–25)
CALCIUM SPEC-SCNC: 9.8 MG/DL (ref 8.6–10.5)
CHLORIDE SERPL-SCNC: 104 MMOL/L (ref 98–107)
CHOLEST SERPL-MCNC: 164 MG/DL (ref 0–200)
CO2 SERPL-SCNC: 26.6 MMOL/L (ref 22–29)
CREAT SERPL-MCNC: 0.57 MG/DL (ref 0.57–1)
GFR SERPL CREATININE-BSD FRML MDRD: 105 ML/MIN/1.73
GLOBULIN UR ELPH-MCNC: 2.2 GM/DL
GLUCOSE SERPL-MCNC: 87 MG/DL (ref 65–99)
HDLC SERPL-MCNC: 52 MG/DL (ref 40–60)
LDLC SERPL CALC-MCNC: 74 MG/DL (ref 0–100)
LDLC/HDLC SERPL: 1.27 {RATIO}
POTASSIUM SERPL-SCNC: 4.9 MMOL/L (ref 3.5–5.2)
PROT SERPL-MCNC: 7 G/DL (ref 6–8.5)
SODIUM SERPL-SCNC: 142 MMOL/L (ref 136–145)
TRIGL SERPL-MCNC: 230 MG/DL (ref 0–150)
VLDLC SERPL-MCNC: 38 MG/DL (ref 5–40)

## 2021-08-03 PROCEDURE — 80061 LIPID PANEL: CPT | Performed by: INTERNAL MEDICINE

## 2021-08-03 PROCEDURE — 80053 COMPREHEN METABOLIC PANEL: CPT | Performed by: INTERNAL MEDICINE

## 2021-08-03 PROCEDURE — 36415 COLL VENOUS BLD VENIPUNCTURE: CPT | Performed by: INTERNAL MEDICINE

## 2021-08-10 ENCOUNTER — OFFICE VISIT (OUTPATIENT)
Dept: CARDIOLOGY | Facility: CLINIC | Age: 71
End: 2021-08-10

## 2021-08-10 VITALS
HEART RATE: 60 BPM | SYSTOLIC BLOOD PRESSURE: 155 MMHG | HEIGHT: 67 IN | DIASTOLIC BLOOD PRESSURE: 82 MMHG | BODY MASS INDEX: 31.08 KG/M2 | WEIGHT: 198 LBS

## 2021-08-10 DIAGNOSIS — I47.1 SUPRAVENTRICULAR TACHYCARDIA (HCC): Primary | ICD-10-CM

## 2021-08-10 DIAGNOSIS — I35.9 AORTIC VALVE DISEASE: ICD-10-CM

## 2021-08-10 DIAGNOSIS — R03.0 BORDERLINE SYSTOLIC HTN: ICD-10-CM

## 2021-08-10 DIAGNOSIS — E78.5 HYPERLIPIDEMIA, UNSPECIFIED HYPERLIPIDEMIA TYPE: ICD-10-CM

## 2021-08-10 PROCEDURE — 99213 OFFICE O/P EST LOW 20 MIN: CPT | Performed by: NURSE PRACTITIONER

## 2021-08-10 PROCEDURE — 93000 ELECTROCARDIOGRAM COMPLETE: CPT | Performed by: NURSE PRACTITIONER

## 2021-08-10 RX ORDER — DIGOXIN 250 MCG
250 TABLET ORAL DAILY
Qty: 30 TABLET | Refills: 3 | Status: SHIPPED | OUTPATIENT
Start: 2021-08-10 | End: 2021-12-03

## 2021-08-10 NOTE — PROGRESS NOTES
Subjective:        Delaney Dunlap is a 71 y.o. female who here for follow up    Chief Complaint   Patient presents with   • Follow-up     1 year with results       HPI    This is a 71-year-old female, who is new to me.  She has a history to include glaucoma, hyperlipidemia, pancreatitis, history of SVT, hypertension, CAD, diabetic neuropathy, and diabetes mellitus type 2.  Most recent lipid panel revealed , HDL 52, LDL 74 and triglycerides 230.  ALT/AST WNL.  Rest test July 2020 indicated a normal myocardial perfusion study with no evidence of ischemia.  Echo in 2020 revealed EF 63.1%, mild to moderate MV regurgitation, aortic valve dimensionless index of 0.5, calcification of the aortic valve, and no evidence of pericardial effusion.  Duplex carotid in 2020 proximal right and left carotid artery plaque without significant stenosis.      The following portions of the patient's history were reviewed and updated as appropriate: allergies, current medications, past family history, past medical history, past social history, past surgical history and problem list.    Past Medical History:   Diagnosis Date   • Diabetes mellitus (CMS/HCC)    • Glaucoma    • Hyperlipidemia    • Pancreatitis    • SVT (supraventricular tachycardia) (CMS/HCC)    • Type 2 diabetes mellitus (CMS/HCC)          reports that she has never smoked. She has never used smokeless tobacco. She reports that she does not drink alcohol and does not use drugs.     Family History   Problem Relation Age of Onset   • Heart attack Mother    • Glaucoma Mother    • Arrhythmia Father    • Angina Father    • Glaucoma Sister    • Breast cancer Sister    • Colon cancer Brother        ROS     Review of Systems  Constitutional: No wt loss, fever, fatigue  Gastrointestinal: No nausea, abdominal pain  Behavioral/Psych: No insomnia or anxiety  Cardiovascular: denies chest pain, shortness of breath, syncope and near syncope.      Objective:           Vitals and  nursing note reviewed.   Constitutional:       Appearance: Well-developed.   HENT:      Head: Normocephalic.      Right Ear: External ear normal.      Left Ear: External ear normal.   Neck:      Vascular: No JVD.   Pulmonary:      Effort: Pulmonary effort is normal. No respiratory distress.      Breath sounds: Normal breath sounds. No stridor. No rales.   Cardiovascular:      Normal rate. Regular rhythm.      No gallop.   Pulses:     Intact distal pulses.   Edema:     Peripheral edema absent.   Abdominal:      General: Bowel sounds are normal. There is no distension.      Palpations: Abdomen is soft.      Tenderness: There is no abdominal tenderness. There is no guarding.   Musculoskeletal: Normal range of motion.         General: No tenderness.      Cervical back: Normal range of motion. Skin:     General: Skin is warm.   Neurological:      Mental Status: Alert and oriented to person, place, and time.      Deep Tendon Reflexes: Reflexes are normal and symmetric.   Psychiatric:         Judgment: Judgment normal.           ECG 12 Lead    Date/Time: 8/10/2021 11:20 AM  Performed by: Bernadine Wynn APRN  Authorized by: Bernadine Wynn APRN   Comparison: compared with previous ECG from 7/6/2020  Similar to previous ECG  Rhythm: sinus rhythm  Rate: normal  BPM: 56    Clinical impression: non-specific ECG          Interpretation Summary    · Proximal right internal carotid artery plaque without significant stenosis.  · Proximal left internal carotid artery plaque without significant stenosis.          2020  Interpretation Summary       · Moderate risk for ischemic heart disease.  · Findings consistent with an abnormal ECG stress test.  · Left ventricular ejection fraction is normal (Calculated EF = 66%).  · Myocardial perfusion imaging indicates a normal myocardial perfusion study with no evidence of ischemia.  · Impressions are consistent with a low risk study.     Asymptomatic for chest pain.  ECG is positive  for ischemia:  2 mm Horizontal ST segment Depression Inferior Leads  1mm Horizontal  ST segment Depression  Leads V4,V5     BP response: Baseline: Initial BP upon arrival to stress lab 145/72, after sitting 10 minutes 124/70, Peak 138/58  Recovery:  1 min 144/60  2 min 144/58  3 min 144/56  4 min 125/60  5 min 125/60   Ectopy: Occasional PVC with exercise, none at baseline and recovery.  Exercise Tolerance: good  Reached 17  Of  20 Ken Scale, Stage III.     Duke treadmill score -8   Estimates an annual cardiovascular mortality of 3 %  And of 5-year survival of  84 % . Using the Duke score there is an intermediate  probability of angiographic coronary disease     Supervised by: Dr. Alvarado     Interpretation Summary    · Aortic valve dimensionless index is 0.5.  · Mild-to-moderate mitral valve regurgitation is present  · Calculated EF = 63.1%.  · There is calcification of the aortic valve.  · There is no evidence of pericardial effusion.            Current Outpatient Medications:   •  digoxin (LANOXIN) 250 MCG tablet, Take 1 tablet by mouth Daily., Disp: 30 tablet, Rfl: 3  •  latanoprost (XALATAN) 0.005 % ophthalmic solution, INT 1 GTT IN OU Q NIGHT, Disp: , Rfl:   •  Multiple Vitamins-Minerals (MULTIVITAMIN WOMEN 50+) tablet, Take  by mouth., Disp: , Rfl:   •  multivitamins-minerals (PRESERVISION AREDS 2) capsule capsule, Take 1 capsule by mouth 2 (Two) Times a Day., Disp: , Rfl:   •  OneTouch Delica Lancets 33G misc, USE AS DIRECTED TWICE DAILY, Disp: 200 each, Rfl: 2  •  OneTouch Verio test strip, Dx code E11.9 USE TO TEST BLOOD GLUCOSE TWICE DAILY AS DIRECTED, Disp: 200 each, Rfl: 1  •  rosuvastatin (CRESTOR) 20 MG tablet, Take 1 tablet by mouth Every Night., Disp: 90 tablet, Rfl: 3  •  Synjardy XR 12.5-1000 MG tablet sustained-release 24 hour, Take 2 tablets by mouth Daily., Disp: 180 tablet, Rfl: 1  •  vitamin D (ERGOCALCIFEROL) 1.25 MG (16952 UT) capsule capsule, TAKE 1 CAPSULE BY MOUTH 1 TIME EVERY  WEEK, Disp: 12 capsule, Rfl: 0     Assessment:        Patient Active Problem List   Diagnosis   • Hyperlipidemia   • Premature atrial contraction   • Supraventricular tachycardia (CMS/HCC)   • Dyslipidemia   • Murmur   • Borderline systolic HTN   • Chronic fatigue   • Type 2 diabetes mellitus without complication, without long-term current use of insulin (CMS/HCC)   • Vitamin D deficiency   • Left-sided carotid artery disease (CMS/HCC)   • Aortic valve disease   • Abnormal weight gain   • Diabetic peripheral neuropathy (CMS/HCC)               Plan:   1.  Aortic valve disease: Stable    2.  Hypertension: Today in the office her blood pressure is slightly elevated. She will monitor her blood pressure at home and we will see in 3 months, or sooner if she has sys 130's or above. She states her blood pressure at home sys 120's.      Educated patient on exercising for at least 30 minutes a day for 2 to 3 days a week. Importance of controlling hypertension and blood pressure checkup on the regular basis has been explained. Hypertension as a silent killer has been discussed. Risk reduction of the weight and regular exercises to control the hypertension has been explained.      3.  Hyperlipidemia: She follows her primary care physician with management of her cholesterol.  Lipid panel as above.      Risk of the hyperlipidemia, importance of the treatment has been explained. Pros and cons of the statins has been explained. Regular blood workup as well as side effects including the liver failure, myelopathy death has been explained.           No diagnosis found.    There are no diagnoses linked to this encounter.    COUNSELING:austin Peña was given to patient for the following topics: diagnostic results, risk factor reductions, impressions, risks and benefits of treatment options and importance of treatment compliance .       SMOKING COUNSELING: denies    Refill digoxin. She will follow-up in 3 months,  unless she needs to be seen sooner.     Sincerely,   MEHUL Rick  [facility]  08/10/21  11:16 EDT     EMR Dragon/Transcription disclaimer:   Much of this encounter note is an electronic transcription/translation of spoken language to printed text. The electronic translation of spoken language may permit erroneous, or at times, nonsensical words or phrases to be inadvertently transcribed; Although I have reviewed the note for such errors, some may still exist.

## 2021-08-18 RX ORDER — ERGOCALCIFEROL 1.25 MG/1
50000 CAPSULE ORAL WEEKLY
Qty: 12 CAPSULE | Refills: 0 | Status: SHIPPED | OUTPATIENT
Start: 2021-08-18 | End: 2021-10-25

## 2021-08-18 RX ORDER — ROSUVASTATIN CALCIUM 20 MG/1
20 TABLET, COATED ORAL NIGHTLY
Qty: 90 TABLET | Refills: 0 | Status: SHIPPED | OUTPATIENT
Start: 2021-08-18 | End: 2022-02-22 | Stop reason: SDUPTHER

## 2021-10-12 RX ORDER — EMPAGLIFLOZIN, METFORMIN HYDROCHLORIDE 12.5; 1 MG/1; MG/1
2 TABLET, EXTENDED RELEASE ORAL DAILY
Qty: 180 TABLET | Refills: 1 | Status: SHIPPED | OUTPATIENT
Start: 2021-10-12 | End: 2022-01-31

## 2021-10-25 RX ORDER — LANCETS 30 GAUGE
EACH MISCELLANEOUS
Qty: 200 EACH | Refills: 2 | Status: SHIPPED | OUTPATIENT
Start: 2021-10-25 | End: 2021-12-14 | Stop reason: SDUPTHER

## 2021-10-25 RX ORDER — ERGOCALCIFEROL 1.25 MG/1
CAPSULE ORAL
Qty: 12 CAPSULE | Refills: 0 | Status: SHIPPED | OUTPATIENT
Start: 2021-10-25 | End: 2022-01-13

## 2021-11-11 ENCOUNTER — OFFICE VISIT (OUTPATIENT)
Dept: CARDIOLOGY | Facility: CLINIC | Age: 71
End: 2021-11-11

## 2021-11-11 VITALS
DIASTOLIC BLOOD PRESSURE: 62 MMHG | HEIGHT: 67 IN | BODY MASS INDEX: 31.55 KG/M2 | OXYGEN SATURATION: 98 % | SYSTOLIC BLOOD PRESSURE: 122 MMHG | HEART RATE: 63 BPM | WEIGHT: 201 LBS

## 2021-11-11 DIAGNOSIS — E78.5 DYSLIPIDEMIA: ICD-10-CM

## 2021-11-11 DIAGNOSIS — I25.10 CORONARY ARTERY DISEASE INVOLVING NATIVE CORONARY ARTERY OF NATIVE HEART WITHOUT ANGINA PECTORIS: ICD-10-CM

## 2021-11-11 DIAGNOSIS — R09.89 BRUIT: ICD-10-CM

## 2021-11-11 DIAGNOSIS — R01.1 MURMUR: Primary | ICD-10-CM

## 2021-11-11 DIAGNOSIS — I35.9 AORTIC VALVE DISEASE: ICD-10-CM

## 2021-11-11 PROCEDURE — 99213 OFFICE O/P EST LOW 20 MIN: CPT | Performed by: INTERNAL MEDICINE

## 2021-11-11 NOTE — PROGRESS NOTES
"3 month follow up    Subjective:        Delaney Dunlap is a 71 y.o. female who here for follow up    CC  mr  HPI  No cp, sob     Problems Addressed this Visit        Cardiac and Vasculature    Dyslipidemia    Murmur - Primary    Relevant Orders    Adult Transthoracic Echo Complete W/ Cont if Necessary Per Protocol    Aortic valve disease    Relevant Orders    Duplex Carotid Ultrasound CAR    Adult Transthoracic Echo Complete W/ Cont if Necessary Per Protocol      Other Visit Diagnoses     Bruit        Relevant Orders    Duplex Carotid Ultrasound CAR    Adult Transthoracic Echo Complete W/ Cont if Necessary Per Protocol      Diagnoses       Codes Comments    Murmur    -  Primary ICD-10-CM: R01.1  ICD-9-CM: 785.2     Aortic valve disease     ICD-10-CM: I35.9  ICD-9-CM: 424.1     Bruit     ICD-10-CM: R09.89  ICD-9-CM: 785.9     Dyslipidemia     ICD-10-CM: E78.5  ICD-9-CM: 272.4         .    The following portions of the patient's history were reviewed and updated as appropriate: allergies, current medications, past family history, past medical history, past social history, past surgical history and problem list.    Past Medical History:   Diagnosis Date   • Diabetes mellitus (HCC)    • Glaucoma    • Hyperlipidemia    • Pancreatitis    • SVT (supraventricular tachycardia) (HCC)    • Type 2 diabetes mellitus (HCC)      reports that she has never smoked. She has never used smokeless tobacco. She reports that she does not drink alcohol and does not use drugs.   Family History   Problem Relation Age of Onset   • Heart attack Mother    • Glaucoma Mother    • Arrhythmia Father    • Angina Father    • Glaucoma Sister    • Breast cancer Sister    • Colon cancer Brother        Review of Systems  Constitutional: No wt loss, fever, fatigue  Gastrointestinal: No nausea, abdominal pain  Behavioral/Psych: No insomnia or anxiety   Cardiovascular no cp  Objective:       Physical Exam  /62   Pulse 63   Ht 170.2 cm (67.01\")   " Wt 91.2 kg (201 lb)   SpO2 98%   BMI 31.47 kg/m²   General appearance: No acute changes   Neck: Trachea midline; NECK, supple, no thyromegaly or lymphadenopathy   Lungs: Normal size and shape, normal breath sounds, equal distribution of air, no rales and rhonchi   CV: S1-S2 regular, no murmurs, no rub, no gallop   Abdomen: Soft, nontender; no masses , no abnormal abdominal sounds   Extremities: No deformity , normal color , no peripheral edema   Skin: Normal temperature, turgor and texture; no rash, ulcers          Procedures    Interpretation Summary    · Proximal right internal carotid artery plaque without significant stenosis.  · Proximal left internal carotid artery plaque without significant stenosis.      Echocardiogram:        Current Outpatient Medications:   •  digoxin (LANOXIN) 250 MCG tablet, Take 1 tablet by mouth Daily., Disp: 30 tablet, Rfl: 3  •  latanoprost (XALATAN) 0.005 % ophthalmic solution, INT 1 GTT IN OU Q NIGHT, Disp: , Rfl:   •  Multiple Vitamins-Minerals (MULTIVITAMIN WOMEN 50+) tablet, Take  by mouth., Disp: , Rfl:   •  multivitamins-minerals (PRESERVISION AREDS 2) capsule capsule, Take 1 capsule by mouth 2 (Two) Times a Day., Disp: , Rfl:   •  OneTouch Delica Lancets 30G misc, USE AS DIRECTED TWICE DAILY, Disp: 200 each, Rfl: 2  •  OneTouch Verio test strip, Dx code E11.9 USE TO TEST BLOOD GLUCOSE TWICE DAILY AS DIRECTED, Disp: 200 each, Rfl: 1  •  rosuvastatin (CRESTOR) 20 MG tablet, Take 1 tablet by mouth Every Night., Disp: 90 tablet, Rfl: 0  •  Synjardy XR 12.5-1000 MG tablet sustained-release 24 hour, Take 2 tablets by mouth Daily., Disp: 180 tablet, Rfl: 1  •  vitamin D (ERGOCALCIFEROL) 1.25 MG (95652 UT) capsule capsule, TAKE 1 CAPSULE BY MOUTH 1 TIME EVERY WEEK, Disp: 12 capsule, Rfl: 0   Assessment:        Patient Active Problem List   Diagnosis   • Hyperlipidemia   • Premature atrial contraction   • Supraventricular tachycardia (HCC)   • Dyslipidemia   • Murmur   • Borderline  systolic HTN   • Chronic fatigue   • Type 2 diabetes mellitus without complication, without long-term current use of insulin (HCC)   • Vitamin D deficiency   • Left-sided carotid artery disease (HCC)   • Aortic valve disease   • Abnormal weight gain   • Diabetic peripheral neuropathy (HCC)               Plan:            ICD-10-CM ICD-9-CM   1. Murmur  R01.1 785.2   2. Aortic valve disease  I35.9 424.1   3. Bruit  R09.89 785.9   4. Dyslipidemia  E78.5 272.4   5. Coronary artery disease involving native coronary artery of native heart without angina pectoris  I25.10 414.01     1. Aortic valve disease  Considering patient's medical condition as well as the risk factors, patient will require echocardiogram for further evaluation for the LV function, four-chamber evaluation, including the pressures, valvular function and  pericardial disease and pericardial effusion    - Duplex Carotid Ultrasound CAR; Future  - Adult Transthoracic Echo Complete W/ Cont if Necessary Per Protocol; Future    2. Bruit    - Duplex Carotid Ultrasound CAR; Future  - Adult Transthoracic Echo Complete W/ Cont if Necessary Per Protocol; Future    3. Murmur  Considering patient's medical condition as well as the risk factors, patient will require echocardiogram for further evaluation for the LV function, four-chamber evaluation, including the pressures, valvular function and  pericardial disease and pericardial effusion    - Adult Transthoracic Echo Complete W/ Cont if Necessary Per Protocol; Future    4. Dyslipidemia  Continue current treatment    5. Coronary artery disease involving native coronary artery of native heart without angina pectoris  No angina pectoris       1 yr with echo and us carotids  COUNSELING:    Delaney Kumarieling was given to patient for the following topics: diagnostic results, risk factor reductions, impressions, risks and benefits of treatment options and importance of treatment compliance .       SMOKING  COUNSELING:    [unfilled]    Dictated using Dragon dictation

## 2021-11-12 PROBLEM — I25.10 CORONARY ARTERY DISEASE INVOLVING NATIVE CORONARY ARTERY OF NATIVE HEART WITHOUT ANGINA PECTORIS: Status: ACTIVE | Noted: 2021-11-12

## 2021-12-03 RX ORDER — DIGOXIN 250 MCG
250 TABLET ORAL DAILY
Qty: 30 TABLET | Refills: 3 | Status: SHIPPED | OUTPATIENT
Start: 2021-12-03 | End: 2022-03-28

## 2021-12-15 RX ORDER — LANCETS 30 GAUGE
1 EACH MISCELLANEOUS 2 TIMES DAILY
Qty: 200 EACH | Refills: 2 | Status: SHIPPED | OUTPATIENT
Start: 2021-12-15 | End: 2022-07-06 | Stop reason: SDUPTHER

## 2021-12-20 RX ORDER — BLOOD SUGAR DIAGNOSTIC
STRIP MISCELLANEOUS
Qty: 200 EACH | Refills: 0 | Status: SHIPPED | OUTPATIENT
Start: 2021-12-20 | End: 2022-04-11

## 2022-01-13 ENCOUNTER — DOCUMENTATION (OUTPATIENT)
Dept: ENDOCRINOLOGY | Age: 72
End: 2022-01-13

## 2022-01-13 RX ORDER — ERGOCALCIFEROL 1.25 MG/1
CAPSULE ORAL
Qty: 12 CAPSULE | Refills: 0 | Status: SHIPPED | OUTPATIENT
Start: 2022-01-13 | End: 2022-04-11

## 2022-01-13 NOTE — PROGRESS NOTES
Benefits Investigation Summary    Prescription: Renewal     Dispensing pharmacy:     Copay amount:  SYNJARDY (HASN'T FILLED) $47    PLAN: LEYLA CAMPOS  BIN: [id number]  PCN: [id number]  RX GROUP: [id number]    Prior Auth and Med Assistance notes:

## 2022-01-31 RX ORDER — EMPAGLIFLOZIN, METFORMIN HYDROCHLORIDE 12.5; 1 MG/1; MG/1
2 TABLET, EXTENDED RELEASE ORAL DAILY
Qty: 180 TABLET | Refills: 1 | Status: SHIPPED | OUTPATIENT
Start: 2022-01-31 | End: 2022-08-04

## 2022-01-31 RX ORDER — EMPAGLIFLOZIN, METFORMIN HYDROCHLORIDE 12.5; 1 MG/1; MG/1
2 TABLET, EXTENDED RELEASE ORAL DAILY
Qty: 180 TABLET | Refills: 1 | Status: SHIPPED | OUTPATIENT
Start: 2022-01-31 | End: 2022-03-10

## 2022-02-22 ENCOUNTER — TELEPHONE (OUTPATIENT)
Dept: ENDOCRINOLOGY | Age: 72
End: 2022-02-22

## 2022-02-22 RX ORDER — ROSUVASTATIN CALCIUM 20 MG/1
20 TABLET, COATED ORAL NIGHTLY
Qty: 90 TABLET | Refills: 0 | Status: SHIPPED | OUTPATIENT
Start: 2022-02-22 | End: 2022-05-23

## 2022-03-10 ENCOUNTER — OFFICE VISIT (OUTPATIENT)
Dept: ENDOCRINOLOGY | Age: 72
End: 2022-03-10

## 2022-03-10 VITALS
DIASTOLIC BLOOD PRESSURE: 65 MMHG | HEIGHT: 67 IN | OXYGEN SATURATION: 99 % | SYSTOLIC BLOOD PRESSURE: 132 MMHG | BODY MASS INDEX: 31.45 KG/M2 | HEART RATE: 66 BPM | WEIGHT: 200.4 LBS

## 2022-03-10 DIAGNOSIS — E78.5 DYSLIPIDEMIA: ICD-10-CM

## 2022-03-10 DIAGNOSIS — E11.9 TYPE 2 DIABETES MELLITUS WITHOUT COMPLICATION, WITHOUT LONG-TERM CURRENT USE OF INSULIN: Primary | ICD-10-CM

## 2022-03-10 PROCEDURE — 99214 OFFICE O/P EST MOD 30 MIN: CPT | Performed by: NURSE PRACTITIONER

## 2022-03-10 NOTE — PROGRESS NOTES
"Chief Complaint  Diabetes    Subjective          Delaney Dunlap presents to [facility] ENDOCRINOLOGY  History of Present Illness     I have reviewed PMH, allergies and medications UTD at this visit     Type 2 dm   Diagnosed around 2017  Today in clinic pt reports being on synjardy XR 12.5-1000mg BID   Checks BG - BID <120  Dm retinopathy - denies ,Last eye exam - UTD 2021  Dm nephropathy - denies  Dm neuropathy - mild   CAD - yes- carotid artery disease  CVA - denies  Episodes of hypoglycemia - denies  Pt is physically active with walking  Pt tries to follow DM diet for most part.   On statin  Lab Results   Component Value Date    HGBA1C 5.7 (H) 03/03/2022         Objective   Vital Signs:   /65   Pulse 66   Ht 170.2 cm (67\")   Wt 90.9 kg (200 lb 6.4 oz)   SpO2 99%   BMI 31.39 kg/m²     Physical Exam  Vitals reviewed.   Constitutional:       General: She is not in acute distress.  HENT:      Head: Normocephalic and atraumatic.   Cardiovascular:      Rate and Rhythm: Normal rate and regular rhythm.   Pulmonary:      Effort: Pulmonary effort is normal. No respiratory distress.   Musculoskeletal:         General: No signs of injury. Normal range of motion.      Cervical back: Normal range of motion and neck supple.   Skin:     General: Skin is warm and dry.   Neurological:      Mental Status: She is alert and oriented to person, place, and time. Mental status is at baseline.   Psychiatric:         Mood and Affect: Mood normal.         Behavior: Behavior normal.         Thought Content: Thought content normal.         Judgment: Judgment normal.        Result Review :   The following data was reviewed by: MEHUL Francis on 03/10/2022:  Common labs    Common Labsle 7/20/21 8/3/21 8/3/21 3/3/22 3/3/22 3/3/22 3/3/22     1027 1027 1026 1026 1026 1026   Glucose  87   111 (A)     BUN  13   15     Creatinine  0.57   0.66     eGFR Non African Am  105        Sodium  142   143     Potassium  " 4.9   4.4     Chloride  104   104     Calcium  9.8   9.9     Total Protein     7.2     Albumin  4.80   5.0 (A)     Total Bilirubin  0.5   0.4     Alkaline Phosphatase  58   64     AST (SGOT)  18   17     ALT (SGPT)  21   18     Total Cholesterol   164       Total Cholesterol      174    Triglycerides   230 (A)   281 (A)    HDL Cholesterol   52   51    LDL Cholesterol    74   78    Hemoglobin A1C 5.30   5.7 (A)      Microalbumin, Urine       12.2   (A) Abnormal value       Comments are available for some flowsheets but are not being displayed.                     Assessment and Plan    Diagnoses and all orders for this visit:    1. Type 2 diabetes mellitus without complication, without long-term current use of insulin (HCC) (Primary)  -     Hemoglobin A1c; Future  -     Comprehensive Metabolic Panel; Future  -     Lipid Panel; Future  -     Microalbumin / Creatinine Urine Ratio - Urine, Clean Catch; Future    2. Dyslipidemia  -     Hemoglobin A1c; Future  -     Comprehensive Metabolic Panel; Future  -     Lipid Panel; Future  -     Microalbumin / Creatinine Urine Ratio - Urine, Clean Catch; Future        Follow Up   No follow-ups on file.     A1c at goal without hypoglycemia  Continue Synjardy  Continue statin    Patient did report a bout of rectal bleeding that has resolved.  To seek ER for recurrence and to follow-up with PCP ASAP for evaluation    Patient was given instructions and counseling regarding her condition or for health maintenance advice. Please see specific information pulled into the AVS if appropriate.     MEHUL Francis

## 2022-03-11 ENCOUNTER — SPECIALTY PHARMACY (OUTPATIENT)
Dept: ENDOCRINOLOGY | Age: 72
End: 2022-03-11

## 2022-03-11 NOTE — PROGRESS NOTES
Specialty Pharmacy Patient Management Program  Endocrinology Introduction to Services Outreach     Delaney Dunlap is a 71 y.o. female with Type 2 Diabetes seen by an Endocrinology provider. This was an initial visit to introduce Endocrinology Patient Management Program and Specialty Pharmacy services offered by [facility], as the patient is taking a specialty medication.  Allergies, PMH, and medications were reviewed during this visit.      Relevant Past Medical History and Comorbidities  Past Medical History:   Diagnosis Date   • Diabetes mellitus (HCC)    • Glaucoma    • Hyperlipidemia    • Pancreatitis    • SVT (supraventricular tachycardia) (HCC)    • Type 2 diabetes mellitus (HCC)      Social History     Socioeconomic History   • Marital status:    Tobacco Use   • Smoking status: Never Smoker   • Smokeless tobacco: Never Used   Vaping Use   • Vaping Use: Never used   Substance and Sexual Activity   • Alcohol use: No   • Drug use: No   • Sexual activity: Defer     Birth control/protection: Surgical            Allergies  Erythromycin and Penicillins         Current Medication List    Current Outpatient Medications:   •  digoxin (LANOXIN) 250 MCG tablet, TAKE 1 TABLET BY MOUTH DAILY, Disp: 30 tablet, Rfl: 3  •  latanoprost (XALATAN) 0.005 % ophthalmic solution, INT 1 GTT IN OU Q NIGHT, Disp: , Rfl:   •  Multiple Vitamins-Minerals (MULTIVITAMIN WOMEN 50+) tablet, Take 1 tablet by mouth Daily., Disp: , Rfl:   •  multivitamins-minerals (PRESERVISION AREDS 2) capsule capsule, Take 1 capsule by mouth 2 (Two) Times a Day., Disp: , Rfl:   •  OneTouch Delica Lancets 30G misc, 1 each by Other route 2 (Two) Times a Day. as directed, Disp: 200 each, Rfl: 2  •  OneTouch Verio test strip, USE TO TEST BLOOD GLUCOSE TWICE DAILY AS DIRECTED, Disp: 200 each, Rfl: 0  •  rosuvastatin (CRESTOR) 20 MG tablet, Take 1 tablet by mouth Every Night., Disp: 90 tablet, Rfl: 0  •  Synjardy XR 12.5-1000 MG tablet  sustained-release 24 hour, TAKE 2 TABLETS BY MOUTH DAILY, Disp: 180 tablet, Rfl: 1  •  vitamin D (ERGOCALCIFEROL) 1.25 MG (27157 UT) capsule capsule, TAKE 1 CAPSULE BY MOUTH 1 TIME EVERY WEEK, Disp: 12 capsule, Rfl: 0         Recommended Medications Assessment  • Aspirin - Not Indicated  • Statin - Currently Taking  • ACEi/ARB - Not Indicated    Initial Education Provided for Specialty Medication  The patient has been provided with the following education and any applicable administration techniques (i.e. self-injection) have been demonstrated for the therapies indicated. All questions and concerns have been addressed prior to the patient receiving the medication, and the patient has verbalized understanding of the education and any materials provided.  Additional patient education shall be provided and documented upon request by the patient, provider or payer.      SYNJARDY® (empagliflozin + metformin)  Medication Expectations   Why am I taking this medication? You are taking this medication to lower blood sugar because you have type 2 diabetes. Diabetes is not curable but with proper medication and treatment, we can keep your blood sugar within your personalized target range. Jardiance (empagliflozin), one of the medications in Synjardy, can reduce the risk of death from heart attack or stroke if you also have heart disease.    What should I expect while on this medication? You should expect to see your blood sugar and A1c decrease over time. You may also see a decrease in your blood pressure and it can help some people lose weight.     How does the medication work? Synjardy works by removing some sugar that the body doesn't need through urination, lowering sugar production in the body, reducing the amount of sugar that enters the bloodstream after a meal, and making your body more sensitive to insulin.     How long will I be on this medication for? The amount of time you will be on this medication will be  determined by your doctor based on blood sugar and A1c control. You will most likely be on this medication or another diabetes medication throughout your lifetime. Do not abruptly stop this medication without talking to your doctor first.    How do I take this medication? Take as directed on your prescription label, usually once or twice a day. Take with food.     What are some possible side effects? The most common side effects include urinary tract infections, genital yeast infections, increased urination, diarrhea, gas, nausea and vomiting, stomach pain, indigestion, headache, and stuffy or runny nose and sore throat. Talk with your doctor if you notice white or yellow vaginal discharge, vaginal itching or odor of if you notice redness, itching, pain, or swelling of the penis and/or bad-smelling discharge from the penis.    What happens if I miss a dose? If you miss a dose, take it as soon as you remember. If it is close to your next dose, skip it (do not take 2 doses at once)     Medication Safety   What are things I should warn my doctor immediately about? Tell your doctor if you have kidney disease, liver disease, heart failure, pancreas problems, vitamin B-12 deficiency, or history of frequent genital yeast or urinary tract infections. Tell your doctor if you are on a low-salt diet, if you drink alcohol, or if you are having surgery. Talk to your doctor if you are pregnant, planning to become pregnant, or breastfeeding. If you have irregular periods or none at all but have not gone through menopause, this medication can cause ovulation and increase the chance of getting pregnant. Also tell your doctor if you notice any signs/symptoms of an allergic reaction (rash, hives, difficulty breathing, etc.).   What are things that I should be cautious of? Be cautious of any side effects from this medication. Talk to your doctor or pharmacist if any new ones develop or aren't getting better.   What are some medications  that can interact with this one? This medication can interact with the dye used for an x-ray or CT-scan. Some medications that interact include ranolazine, cimetidine, diuretics (water pills), and other medications that may also lower your blood sugar such as insulins and glipizide/glimepiride/glyburide. Your doctor may reduce the dose of other diabetes medications when you start Synjardy to minimize low blood sugars. Always tell your doctor or pharmacist immediately if you start taking any new medications, including over-the-counter medications, vitamins, and herbal supplements.      Medication Storage/Handling   How should I handle this medication? Keep this medication out of reach of pets/children in tightly sealed container   How does this medication need to be stored? Store at room temperature and keep dry (don't keep in bathroom or other room with moisture)   How should I dispose of this medication? There should not be a need to dispose of this medication unless your provider decides to change the dose or therapy. If that is the case, take to your local police station for proper disposal. Some pharmacies also have take-back bins for medication drop-off.      Resources/Support   How can I remind myself to take this medication? You can download reminder apps to help you manage your refills. You may also set an alarm on your phone to remind you. The pharmacy carries pill boxes that you can place next to an area you pass everyday (such as where you place your car keys or where you charge your phone)   Is financial support available?  [employer] and [employer] can provide co-pay cards if you have commercial insurance or patient assistance if you have Medicare or no insurance.    Which vaccines are recommended for me? Talk to your doctor about these vaccines: Flu, Coronavirus (COVID-19), Pneumococcal (pneumonia), Tdap, Hepatitis B, Zoster (shingles)        Reassessment Plan & Follow-Up  1. Medication  Therapy Changes: no changes  2. Additional Plans, Therapy Recommendations, or Therapy Problems to Be Addressed: N/A   3. Patient declined filling their specialty medication(s) at [facility] and/or enrollment in the Endocrine Disorders Patient Management Program at this time.       Reshma RockD, BCCP, BCPS   3/11/2022  15:40 EST

## 2022-03-28 RX ORDER — DIGOXIN 250 MCG
250 TABLET ORAL DAILY
Qty: 30 TABLET | Refills: 3 | Status: SHIPPED | OUTPATIENT
Start: 2022-03-28 | End: 2022-07-25

## 2022-04-11 RX ORDER — ERGOCALCIFEROL 1.25 MG/1
CAPSULE ORAL
Qty: 12 CAPSULE | Refills: 1 | Status: SHIPPED | OUTPATIENT
Start: 2022-04-11 | End: 2022-09-23

## 2022-04-11 RX ORDER — BLOOD SUGAR DIAGNOSTIC
STRIP MISCELLANEOUS
Qty: 200 EACH | Refills: 3 | Status: SHIPPED | OUTPATIENT
Start: 2022-04-11 | End: 2022-12-16

## 2022-05-23 RX ORDER — ROSUVASTATIN CALCIUM 20 MG/1
20 TABLET, COATED ORAL NIGHTLY
Qty: 90 TABLET | Refills: 0 | Status: SHIPPED | OUTPATIENT
Start: 2022-05-23 | End: 2022-08-17

## 2022-06-08 ENCOUNTER — LAB (OUTPATIENT)
Dept: ENDOCRINOLOGY | Age: 72
End: 2022-06-08

## 2022-06-08 DIAGNOSIS — E11.9 TYPE 2 DIABETES MELLITUS WITHOUT COMPLICATION, WITHOUT LONG-TERM CURRENT USE OF INSULIN: ICD-10-CM

## 2022-06-08 DIAGNOSIS — E78.5 DYSLIPIDEMIA: ICD-10-CM

## 2022-06-09 LAB
ALBUMIN SERPL-MCNC: 5.1 G/DL (ref 3.8–4.8)
ALBUMIN/CREAT UR: 16 MG/G CREAT (ref 0–29)
ALBUMIN/GLOB SERPL: 2.3 {RATIO} (ref 1.2–2.2)
ALP SERPL-CCNC: 65 IU/L (ref 44–121)
ALT SERPL-CCNC: 19 IU/L (ref 0–32)
AST SERPL-CCNC: 18 IU/L (ref 0–40)
BILIRUB SERPL-MCNC: 0.4 MG/DL (ref 0–1.2)
BUN SERPL-MCNC: 15 MG/DL (ref 8–27)
BUN/CREAT SERPL: 22 (ref 12–28)
CALCIUM SERPL-MCNC: 10 MG/DL (ref 8.7–10.3)
CHLORIDE SERPL-SCNC: 104 MMOL/L (ref 96–106)
CHOLEST SERPL-MCNC: 175 MG/DL (ref 100–199)
CO2 SERPL-SCNC: 23 MMOL/L (ref 20–29)
CREAT SERPL-MCNC: 0.69 MG/DL (ref 0.57–1)
CREAT UR-MCNC: 104.4 MG/DL
EGFRCR SERPLBLD CKD-EPI 2021: 93 ML/MIN/1.73
GLOBULIN SER CALC-MCNC: 2.2 G/DL (ref 1.5–4.5)
GLUCOSE SERPL-MCNC: 100 MG/DL (ref 65–99)
HBA1C MFR BLD: 5.9 % (ref 4.8–5.6)
HDLC SERPL-MCNC: 49 MG/DL
IMP & REVIEW OF LAB RESULTS: NORMAL
LDLC SERPL CALC-MCNC: 81 MG/DL (ref 0–99)
MICROALBUMIN UR-MCNC: 17 UG/ML
POTASSIUM SERPL-SCNC: 4.3 MMOL/L (ref 3.5–5.2)
PROT SERPL-MCNC: 7.3 G/DL (ref 6–8.5)
SODIUM SERPL-SCNC: 142 MMOL/L (ref 134–144)
TRIGL SERPL-MCNC: 274 MG/DL (ref 0–149)
VLDLC SERPL CALC-MCNC: 45 MG/DL (ref 5–40)

## 2022-06-16 ENCOUNTER — OFFICE VISIT (OUTPATIENT)
Dept: ENDOCRINOLOGY | Age: 72
End: 2022-06-16

## 2022-06-16 VITALS
WEIGHT: 202 LBS | SYSTOLIC BLOOD PRESSURE: 140 MMHG | DIASTOLIC BLOOD PRESSURE: 70 MMHG | BODY MASS INDEX: 31.71 KG/M2 | TEMPERATURE: 97.8 F | HEIGHT: 67 IN | HEART RATE: 73 BPM | OXYGEN SATURATION: 97 %

## 2022-06-16 DIAGNOSIS — E55.9 VITAMIN D DEFICIENCY: ICD-10-CM

## 2022-06-16 DIAGNOSIS — E11.69 HYPERLIPIDEMIA ASSOCIATED WITH TYPE 2 DIABETES MELLITUS: ICD-10-CM

## 2022-06-16 DIAGNOSIS — E11.9 TYPE 2 DIABETES MELLITUS WITHOUT COMPLICATION, WITHOUT LONG-TERM CURRENT USE OF INSULIN: Primary | ICD-10-CM

## 2022-06-16 DIAGNOSIS — E78.5 HYPERLIPIDEMIA ASSOCIATED WITH TYPE 2 DIABETES MELLITUS: ICD-10-CM

## 2022-06-16 PROCEDURE — 99214 OFFICE O/P EST MOD 30 MIN: CPT | Performed by: INTERNAL MEDICINE

## 2022-06-16 NOTE — PROGRESS NOTES
"Chief Complaint  Type 2 dm    Subjective          History of Present Illness    Delaney Dunlap 71 y.o. presents with Type 2 dm as a new patient. Consulted by      Type 2 dm - Diagnosed about 5 years ago.   Today in clinic pt reports being on Synjardy - 2 tabs per day.   Was on bydureon but was stopped due to hx of pancreatitis  Avg bg - 120 - 150  Checks BG - 1 - 2 times /day  Sensor - x  Dm retinopathy -x ,Last eye exam - uptodate with exam.  Dm nephropathy - x  Dm neuropathy -yes at times ,Dm neuropathy meds - n/a  CAD -x  CVA -x  Episodes of hypoglycemia - x  Pt is physically active. weight has been stable.   Pt tries to follow DM diet for most part.         Reviewed primary care physician's/consulting physician documentation and lab results         I have reviewed the patient's allergies, medicines, past medical hx, family hx and social hx in detail.    Objective   Vital Signs:   /70   Pulse 73   Temp 97.8 °F (36.6 °C) (Temporal)   Ht 170.2 cm (67\")   Wt 91.6 kg (202 lb)   SpO2 97%   BMI 31.64 kg/m²   Physical Exam   General appearance - no distress  Eyes- anicteric sclera  Ear nose and throat-external ears and nose normal.    Respiratory-normal chest on inspection.  No respiratory distress noted.  Skin-no rashes.  Neuro-alert and oriented x3            Result Review :   The following data was reviewed by: Candi Carter MD on 06/16/2022:  Orders Only on 06/08/2022   Component Date Value Ref Range Status   • Glucose 06/08/2022 100 (A) 65 - 99 mg/dL Final   • BUN 06/08/2022 15  8 - 27 mg/dL Final   • Creatinine 06/08/2022 0.69  0.57 - 1.00 mg/dL Final   • EGFR Result 06/08/2022 93  >59 mL/min/1.73 Final   • BUN/Creatinine Ratio 06/08/2022 22  12 - 28 Final   • Sodium 06/08/2022 142  134 - 144 mmol/L Final   • Potassium 06/08/2022 4.3  3.5 - 5.2 mmol/L Final   • Chloride 06/08/2022 104  96 - 106 mmol/L Final   • Total CO2 06/08/2022 23  20 - 29 mmol/L Final   • Calcium 06/08/2022 10.0  8.7 - " 10.3 mg/dL Final   • Total Protein 06/08/2022 7.3  6.0 - 8.5 g/dL Final   • Albumin 06/08/2022 5.1 (A) 3.8 - 4.8 g/dL Final   • Globulin 06/08/2022 2.2  1.5 - 4.5 g/dL Final   • A/G Ratio 06/08/2022 2.3 (A) 1.2 - 2.2 Final   • Total Bilirubin 06/08/2022 0.4  0.0 - 1.2 mg/dL Final   • Alkaline Phosphatase 06/08/2022 65  44 - 121 IU/L Final   • AST (SGOT) 06/08/2022 18  0 - 40 IU/L Final   • ALT (SGPT) 06/08/2022 19  0 - 32 IU/L Final   • Total Cholesterol 06/08/2022 175  100 - 199 mg/dL Final   • Triglycerides 06/08/2022 274 (A) 0 - 149 mg/dL Final   • HDL Cholesterol 06/08/2022 49  >39 mg/dL Final   • VLDL Cholesterol Jean 06/08/2022 45 (A) 5 - 40 mg/dL Final   • LDL Chol Calc (NIH) 06/08/2022 81  0 - 99 mg/dL Final   • Creatinine, Urine 06/08/2022 104.4  Not Estab. mg/dL Final   • Microalbumin, Urine 06/08/2022 17.0  Not Estab. ug/mL Final   • Microalbumin/Creatinine Ratio 06/08/2022 16  0 - 29 mg/g creat Final    Comment:                        Normal:                0 -  29                         Moderately increased: 30 - 300                         Severely increased:       >300     • Hemoglobin A1C 06/08/2022 5.9 (A) 4.8 - 5.6 % Final    Comment:          Prediabetes: 5.7 - 6.4           Diabetes: >6.4           Glycemic control for adults with diabetes: <7.0     • Interpretation 06/08/2022 Note   Final    Supplemental report is available.     Data reviewed: PCP NOTES       Results Review:    I reviewed the patient's new clinical results.     Assessment and Plan    Problem List Items Addressed This Visit        Other    Type 2 diabetes mellitus without complication, without long-term current use of insulin (HCC) - Primary    Relevant Orders    TSH    T4, Free    Basic Metabolic Panel    Hemoglobin A1c    Lipid Panel    Vitamin B12 & Folate    Vitamin D 25 Hydroxy    Vitamin D deficiency    Relevant Orders    Vitamin D 25 Hydroxy      Other Visit Diagnoses     Hyperlipidemia associated with type 2 diabetes  "mellitus (HCC)        Relevant Orders    TSH    T4, Free    Basic Metabolic Panel    Hemoglobin A1c    Lipid Panel    Vitamin B12 & Folate    Vitamin D 25 Hydroxy        Type 2 diabetes mellitus-hyperglycemia-chronic problem   continue Synjardy    Hyperlipidemia  Continue Crestor    Interpreted the blood work-up/imaging results performed by the primary care/consulting physician -    Refills sent to pharmacy    Follow Up     Patient was given instructions and counseling regarding her condition or for health maintenance advice. Please see specific information pulled into the AVS if appropriate.       Thank you for asking me to see your patient, Delaney Dunlap in consultation.         Candi Carter MD  06/16/22      EMR Dragon / transcription disclaimer:     \"Dictated utilizing Dragon dictation\".         "

## 2022-07-07 RX ORDER — LANCETS 30 GAUGE
1 EACH MISCELLANEOUS 2 TIMES DAILY
Qty: 200 EACH | Refills: 2 | Status: SHIPPED | OUTPATIENT
Start: 2022-07-07 | End: 2022-12-16

## 2022-07-25 RX ORDER — DIGOXIN 250 MCG
250 TABLET ORAL DAILY
Qty: 30 TABLET | Refills: 3 | Status: SHIPPED | OUTPATIENT
Start: 2022-07-25 | End: 2023-01-01 | Stop reason: SDUPTHER

## 2022-08-04 RX ORDER — EMPAGLIFLOZIN, METFORMIN HYDROCHLORIDE 12.5; 1 MG/1; MG/1
2 TABLET, EXTENDED RELEASE ORAL DAILY
Qty: 180 TABLET | Refills: 1 | Status: SHIPPED | OUTPATIENT
Start: 2022-08-04 | End: 2022-12-16 | Stop reason: SDUPTHER

## 2022-08-17 RX ORDER — ROSUVASTATIN CALCIUM 20 MG/1
20 TABLET, COATED ORAL NIGHTLY
Qty: 90 TABLET | Refills: 0 | Status: SHIPPED | OUTPATIENT
Start: 2022-08-17 | End: 2022-11-14

## 2022-09-23 DIAGNOSIS — E55.9 VITAMIN D DEFICIENCY: Primary | ICD-10-CM

## 2022-09-23 RX ORDER — ERGOCALCIFEROL 1.25 MG/1
CAPSULE ORAL
Qty: 12 CAPSULE | Refills: 1 | Status: SHIPPED | OUTPATIENT
Start: 2022-09-23 | End: 2022-12-16 | Stop reason: SDUPTHER

## 2022-10-24 ENCOUNTER — HOSPITAL ENCOUNTER (OUTPATIENT)
Dept: CARDIOLOGY | Facility: HOSPITAL | Age: 72
Discharge: HOME | End: 2022-10-24

## 2022-10-24 VITALS
HEIGHT: 67 IN | DIASTOLIC BLOOD PRESSURE: 70 MMHG | BODY MASS INDEX: 31.71 KG/M2 | SYSTOLIC BLOOD PRESSURE: 140 MMHG | HEART RATE: 60 BPM | WEIGHT: 202 LBS

## 2022-10-24 DIAGNOSIS — R09.89 BRUIT: ICD-10-CM

## 2022-10-24 DIAGNOSIS — I35.9 AORTIC VALVE DISEASE: ICD-10-CM

## 2022-10-24 DIAGNOSIS — R01.1 MURMUR: ICD-10-CM

## 2022-10-24 LAB
BH CV XLRA MEAS LEFT DIST CCA EDV: -11.3 CM/SEC
BH CV XLRA MEAS LEFT DIST CCA PSV: -60.4 CM/SEC
BH CV XLRA MEAS LEFT DIST ICA EDV: -22.4 CM/SEC
BH CV XLRA MEAS LEFT DIST ICA PSV: -72.4 CM/SEC
BH CV XLRA MEAS LEFT ICA/CCA RATIO: 1.2
BH CV XLRA MEAS LEFT MID ICA EDV: -18.4 CM/SEC
BH CV XLRA MEAS LEFT MID ICA PSV: -58.8 CM/SEC
BH CV XLRA MEAS LEFT PROX CCA EDV: 14.9 CM/SEC
BH CV XLRA MEAS LEFT PROX CCA PSV: 110 CM/SEC
BH CV XLRA MEAS LEFT PROX ECA EDV: -7.4 CM/SEC
BH CV XLRA MEAS LEFT PROX ECA PSV: -72.7 CM/SEC
BH CV XLRA MEAS LEFT PROX ICA EDV: -11 CM/SEC
BH CV XLRA MEAS LEFT PROX ICA PSV: -42.3 CM/SEC
BH CV XLRA MEAS LEFT PROX SCLA PSV: 116.8 CM/SEC
BH CV XLRA MEAS LEFT VERTEBRAL A EDV: -6.9 CM/SEC
BH CV XLRA MEAS LEFT VERTEBRAL A PSV: -44.5 CM/SEC
BH CV XLRA MEAS RIGHT DIST CCA EDV: -12.3 CM/SEC
BH CV XLRA MEAS RIGHT DIST CCA PSV: -66.8 CM/SEC
BH CV XLRA MEAS RIGHT DIST ICA EDV: -19.7 CM/SEC
BH CV XLRA MEAS RIGHT DIST ICA PSV: -81.1 CM/SEC
BH CV XLRA MEAS RIGHT ICA/CCA RATIO: 1.23
BH CV XLRA MEAS RIGHT MID ICA EDV: -20.6 CM/SEC
BH CV XLRA MEAS RIGHT MID ICA PSV: -75.7 CM/SEC
BH CV XLRA MEAS RIGHT PROX CCA EDV: 14.9 CM/SEC
BH CV XLRA MEAS RIGHT PROX CCA PSV: 88.8 CM/SEC
BH CV XLRA MEAS RIGHT PROX ECA EDV: -6.9 CM/SEC
BH CV XLRA MEAS RIGHT PROX ECA PSV: -84.5 CM/SEC
BH CV XLRA MEAS RIGHT PROX ICA EDV: -16.2 CM/SEC
BH CV XLRA MEAS RIGHT PROX ICA PSV: -64.4 CM/SEC
BH CV XLRA MEAS RIGHT PROX SCLA PSV: 129.8 CM/SEC
BH CV XLRA MEAS RIGHT VERTEBRAL A EDV: -11 CM/SEC
BH CV XLRA MEAS RIGHT VERTEBRAL A PSV: -53.1 CM/SEC
LEFT ARM BP: NORMAL MMHG
MAXIMAL PREDICTED HEART RATE: 149 BPM
RIGHT ARM BP: NORMAL MMHG
STRESS TARGET HR: 127 BPM

## 2022-10-24 PROCEDURE — 93880 EXTRACRANIAL BILAT STUDY: CPT

## 2022-10-24 PROCEDURE — 93306 TTE W/DOPPLER COMPLETE: CPT

## 2022-10-24 PROCEDURE — 93306 TTE W/DOPPLER COMPLETE: CPT | Performed by: INTERNAL MEDICINE

## 2022-10-25 LAB
AORTIC ARCH: 2.4 CM
ASCENDING AORTA: 2.9 CM
BH CV ECHO MEAS - ACS: 1.39 CM
BH CV ECHO MEAS - AO MAX PG: 28.4 MMHG
BH CV ECHO MEAS - AO MEAN PG: 14.5 MMHG
BH CV ECHO MEAS - AO ROOT DIAM: 2.9 CM
BH CV ECHO MEAS - AO V2 MAX: 266.6 CM/SEC
BH CV ECHO MEAS - AO V2 VTI: 61.1 CM
BH CV ECHO MEAS - AVA(I,D): 1.21 CM2
BH CV ECHO MEAS - EDV(CUBED): 91.1 ML
BH CV ECHO MEAS - EDV(MOD-SP2): 87 ML
BH CV ECHO MEAS - EDV(MOD-SP4): 108 ML
BH CV ECHO MEAS - EF(MOD-BP): 63.8 %
BH CV ECHO MEAS - EF(MOD-SP2): 59.8 %
BH CV ECHO MEAS - EF(MOD-SP4): 67.6 %
BH CV ECHO MEAS - EF_3D-VOL: 60 %
BH CV ECHO MEAS - ESV(CUBED): 26.6 ML
BH CV ECHO MEAS - ESV(MOD-SP2): 35 ML
BH CV ECHO MEAS - ESV(MOD-SP4): 35 ML
BH CV ECHO MEAS - FS: 33.7 %
BH CV ECHO MEAS - IVS/LVPW: 1 CM
BH CV ECHO MEAS - IVSD: 1 CM
BH CV ECHO MEAS - LAT PEAK E' VEL: 8.9 CM/SEC
BH CV ECHO MEAS - LV MASS(C)D: 153.3 GRAMS
BH CV ECHO MEAS - LV MAX PG: 4.5 MMHG
BH CV ECHO MEAS - LV MEAN PG: 2 MMHG
BH CV ECHO MEAS - LV V1 MAX: 106 CM/SEC
BH CV ECHO MEAS - LV V1 VTI: 24.6 CM
BH CV ECHO MEAS - LVIDD: 4.5 CM
BH CV ECHO MEAS - LVIDS: 3 CM
BH CV ECHO MEAS - LVOT AREA: 3 CM2
BH CV ECHO MEAS - LVOT DIAM: 1.96 CM
BH CV ECHO MEAS - LVPWD: 1 CM
BH CV ECHO MEAS - MED PEAK E' VEL: 9.2 CM/SEC
BH CV ECHO MEAS - MV A DUR: 0.16 SEC
BH CV ECHO MEAS - MV A MAX VEL: 85.7 CM/SEC
BH CV ECHO MEAS - MV DEC SLOPE: 338.1 CM/SEC2
BH CV ECHO MEAS - MV DEC TIME: 0.18 MSEC
BH CV ECHO MEAS - MV E MAX VEL: 103 CM/SEC
BH CV ECHO MEAS - MV E/A: 1.2
BH CV ECHO MEAS - MV MAX PG: 4.9 MMHG
BH CV ECHO MEAS - MV MEAN PG: 2.7 MMHG
BH CV ECHO MEAS - MV P1/2T: 90.7 MSEC
BH CV ECHO MEAS - MV V2 VTI: 38.8 CM
BH CV ECHO MEAS - MVA(P1/2T): 2.43 CM2
BH CV ECHO MEAS - MVA(VTI): 1.9 CM2
BH CV ECHO MEAS - PA ACC TIME: 0.15 SEC
BH CV ECHO MEAS - PA PR(ACCEL): 11.3 MMHG
BH CV ECHO MEAS - PA V2 MAX: 129 CM/SEC
BH CV ECHO MEAS - PULM A REVS DUR: 0.19 SEC
BH CV ECHO MEAS - PULM A REVS VEL: 29.1 CM/SEC
BH CV ECHO MEAS - PULM DIAS VEL: 51.8 CM/SEC
BH CV ECHO MEAS - PULM S/D: 1.03
BH CV ECHO MEAS - PULM SYS VEL: 53.6 CM/SEC
BH CV ECHO MEAS - QP/QS: 0.71
BH CV ECHO MEAS - RV MAX PG: 2.5 MMHG
BH CV ECHO MEAS - RV V1 MAX: 79.7 CM/SEC
BH CV ECHO MEAS - RV V1 VTI: 16.9 CM
BH CV ECHO MEAS - RVOT DIAM: 1.99 CM
BH CV ECHO MEAS - SUP REN AO DIAM: 2.3 CM
BH CV ECHO MEAS - SV(LVOT): 74 ML
BH CV ECHO MEAS - SV(MOD-SP2): 52 ML
BH CV ECHO MEAS - SV(MOD-SP4): 73 ML
BH CV ECHO MEAS - SV(RVOT): 52.5 ML
BH CV ECHO MEAS - TAPSE (>1.6): 2.8 CM
BH CV ECHO MEASUREMENTS AVERAGE E/E' RATIO: 11.38
BH CV XLRA - RV BASE: 3.2 CM
BH CV XLRA - RV LENGTH: 6.5 CM
BH CV XLRA - RV MID: 2.6 CM
BH CV XLRA - TDI S': 14 CM/SEC
LEFT ATRIUM VOLUME INDEX: 19.8 ML/M2
MAXIMAL PREDICTED HEART RATE: 149 BPM
SINUS: 2.9 CM
STJ: 2.9 CM
STRESS TARGET HR: 127 BPM

## 2022-11-10 ENCOUNTER — OFFICE VISIT (OUTPATIENT)
Dept: CARDIOLOGY | Facility: CLINIC | Age: 72
End: 2022-11-10

## 2022-11-10 VITALS
BODY MASS INDEX: 31.55 KG/M2 | WEIGHT: 201 LBS | SYSTOLIC BLOOD PRESSURE: 146 MMHG | HEIGHT: 67 IN | DIASTOLIC BLOOD PRESSURE: 80 MMHG | HEART RATE: 68 BPM

## 2022-11-10 DIAGNOSIS — R01.1 MURMUR: ICD-10-CM

## 2022-11-10 DIAGNOSIS — I35.9 AORTIC VALVE DISEASE: Primary | ICD-10-CM

## 2022-11-10 DIAGNOSIS — I25.10 CORONARY ARTERY DISEASE INVOLVING NATIVE CORONARY ARTERY OF NATIVE HEART WITHOUT ANGINA PECTORIS: ICD-10-CM

## 2022-11-10 DIAGNOSIS — E78.5 DYSLIPIDEMIA: ICD-10-CM

## 2022-11-10 PROCEDURE — 93000 ELECTROCARDIOGRAM COMPLETE: CPT | Performed by: INTERNAL MEDICINE

## 2022-11-10 PROCEDURE — 99213 OFFICE O/P EST LOW 20 MIN: CPT | Performed by: INTERNAL MEDICINE

## 2022-11-10 RX ORDER — CHLORAL HYDRATE 500 MG
CAPSULE ORAL
COMMUNITY
Start: 2022-06-18

## 2022-11-10 NOTE — PROGRESS NOTES
Subjective:        Delaney Dunlap is a 72 y.o. female who here for follow up    CC  MURMUR  HPI  72-year-old female with a heart murmur aortic valve disease dyslipidemia and coronary artery disease here for the follow-up denies any chest pains tightness heaviness or the pressure sensation     Problems Addressed this Visit        Cardiac and Vasculature    Dyslipidemia    Murmur    Aortic valve disease - Primary    Coronary artery disease involving native coronary artery of native heart without angina pectoris   Diagnoses       Codes Comments    Aortic valve disease    -  Primary ICD-10-CM: I35.9  ICD-9-CM: 424.1     Murmur     ICD-10-CM: R01.1  ICD-9-CM: 785.2     Dyslipidemia     ICD-10-CM: E78.5  ICD-9-CM: 272.4     Coronary artery disease involving native coronary artery of native heart without angina pectoris     ICD-10-CM: I25.10  ICD-9-CM: 414.01         .    The following portions of the patient's history were reviewed and updated as appropriate: allergies, current medications, past family history, past medical history, past social history, past surgical history and problem list.    Past Medical History:   Diagnosis Date   • Diabetes mellitus (HCC)    • Glaucoma    • Hyperlipidemia    • Pancreatitis    • SVT (supraventricular tachycardia) (HCC)    • Type 2 diabetes mellitus (HCC)      reports that she has never smoked. She has never used smokeless tobacco. She reports that she does not drink alcohol and does not use drugs.   Family History   Problem Relation Age of Onset   • Heart attack Mother    • Glaucoma Mother    • Arrhythmia Father    • Angina Father    • Glaucoma Sister    • Breast cancer Sister    • Colon cancer Brother        Review of Systems  Constitutional: No wt loss, fever, fatigue  Gastrointestinal: No nausea, abdominal pain  Behavioral/Psych: No insomnia or anxiety   Cardiovascular no chest pains or tightness in the chest  Objective:                  Physical Exam  /80   Pulse 68    "Ht 170.2 cm (67\")   Wt 91.2 kg (201 lb)   BMI 31.48 kg/m²     General appearance: No acute changes   Eyes: Sclerae conjunctivae normal, pupils reactive   HENT: Atraumatic; oropharynx clear with moist mucous membranes and no mucosal ulcerations;  Neck: Trachea midline; NECK, supple, no thyromegaly or lymphadenopathy   Lungs: Normal size and shape, normal breath sounds, equal distribution of air, no rales and rhonchi   CV: S1-S2 regular, SYS murmurs, no rub, no gallop   Abdomen: Soft, nontender; no masses , no abnormal abdominal sounds   Extremities: No deformity , normal color , no peripheral edema   Skin: Normal temperature, turgor and texture; no rash, ulcers  Psych: Appropriate affect, alert and oriented to person, place and time           ECG 12 Lead    Date/Time: 11/10/2022 12:37 PM  Performed by: Karthikeyan Alvarado MD  Authorized by: Karthikeyan Alvarado MD   Comparison: compared with previous ECG   Similar to previous ECG  Rhythm: sinus rhythm  ST Flattening: all    Clinical impression: non-specific ECG          Interpretation Summary       •  Proximal right internal carotid artery plaque without significant stenosis.  •  Proximal left internal carotid artery plaque without significant stenosis.    Interpretation Summary       •  Left ventricular ejection fraction appears to be 56 - 60%.  •  There is calcification of the aortic valve    Echocardiogram:        Current Outpatient Medications:   •  digoxin (LANOXIN) 250 MCG tablet, TAKE 1 TABLET BY MOUTH DAILY, Disp: 30 tablet, Rfl: 3  •  latanoprost (XALATAN) 0.005 % ophthalmic solution, INT 1 GTT IN OU Q NIGHT, Disp: , Rfl:   •  Multiple Vitamins-Minerals (MULTIVITAMIN WOMEN 50+) tablet, Take 1 tablet by mouth Daily., Disp: , Rfl:   •  multivitamins-minerals (PRESERVISION AREDS 2) capsule capsule, Take 1 capsule by mouth 2 (Two) Times a Day., Disp: , Rfl:   •  Omega-3 1000 MG capsule, , Disp: , Rfl:   •  OneTouch Delica Lancets 30G misc, 1 each by Other " route 2 (Two) Times a Day. as directed, Disp: 200 each, Rfl: 2  •  OneTouch Verio test strip, Dx code E11.9 USE TO TEST BLOOD SUGAR TWICE DAILY, Disp: 200 each, Rfl: 3  •  Synjardy XR 12.5-1000 MG tablet sustained-release 24 hour, TAKE 2 TABLETS BY MOUTH DAILY, Disp: 180 tablet, Rfl: 1  •  vitamin D (ERGOCALCIFEROL) 1.25 MG (63245 UT) capsule capsule, TAKE 1 CAPSULE BY MOUTH 1 TIME EVERY WEEK, Disp: 12 capsule, Rfl: 1  •  rosuvastatin (CRESTOR) 20 MG tablet, TAKE 1 TABLET BY MOUTH EVERY NIGHT, Disp: 90 tablet, Rfl: 0   Assessment:        Patient Active Problem List   Diagnosis   • Hyperlipidemia   • Premature atrial contraction   • Supraventricular tachycardia (HCC)   • Dyslipidemia   • Murmur   • Borderline systolic HTN   • Chronic fatigue   • Type 2 diabetes mellitus without complication, without long-term current use of insulin (HCC)   • Vitamin D deficiency   • Left-sided carotid artery disease (HCC)   • Aortic valve disease   • Abnormal weight gain   • Diabetic peripheral neuropathy (HCC)   • Coronary artery disease involving native coronary artery of native heart without angina pectoris               Plan:            ICD-10-CM ICD-9-CM   1. Aortic valve disease  I35.9 424.1   2. Murmur  R01.1 785.2   3. Dyslipidemia  E78.5 272.4   4. Coronary artery disease involving native coronary artery of native heart without angina pectoris  I25.10 414.01     1. Aortic valve disease  Treat conservatively    2. Murmur  Due to aortic valve    3. Dyslipidemia  Continue current treatment    4. Coronary artery disease involving native coronary artery of native heart without angina pectoris  No angina pectoris       1 YR  COUNSELING:    Delaney Peña was given to patient for the following topics: diagnostic results, risk factor reductions, impressions, risks and benefits of treatment options and importance of treatment compliance .       SMOKING COUNSELING:        Dictated using Dragon dictation

## 2022-11-14 RX ORDER — ROSUVASTATIN CALCIUM 20 MG/1
20 TABLET, COATED ORAL NIGHTLY
Qty: 90 TABLET | Refills: 0 | Status: SHIPPED | OUTPATIENT
Start: 2022-11-14 | End: 2022-12-16 | Stop reason: SDUPTHER

## 2022-12-09 ENCOUNTER — LAB (OUTPATIENT)
Dept: ENDOCRINOLOGY | Age: 72
End: 2022-12-09

## 2022-12-09 DIAGNOSIS — E55.9 VITAMIN D DEFICIENCY: ICD-10-CM

## 2022-12-09 DIAGNOSIS — E78.5 HYPERLIPIDEMIA ASSOCIATED WITH TYPE 2 DIABETES MELLITUS: ICD-10-CM

## 2022-12-09 DIAGNOSIS — E11.9 TYPE 2 DIABETES MELLITUS WITHOUT COMPLICATION, WITHOUT LONG-TERM CURRENT USE OF INSULIN: ICD-10-CM

## 2022-12-09 DIAGNOSIS — E11.69 HYPERLIPIDEMIA ASSOCIATED WITH TYPE 2 DIABETES MELLITUS: ICD-10-CM

## 2022-12-09 LAB
25(OH)D3+25(OH)D2 SERPL-MCNC: 55.5 NG/ML (ref 30–100)
BUN SERPL-MCNC: 14 MG/DL (ref 8–23)
BUN/CREAT SERPL: 21.2 (ref 7–25)
CALCIUM SERPL-MCNC: 10.1 MG/DL (ref 8.6–10.5)
CHLORIDE SERPL-SCNC: 104 MMOL/L (ref 98–107)
CHOLEST SERPL-MCNC: 157 MG/DL (ref 0–200)
CO2 SERPL-SCNC: 25 MMOL/L (ref 22–29)
CREAT SERPL-MCNC: 0.66 MG/DL (ref 0.57–1)
EGFRCR SERPLBLD CKD-EPI 2021: 93.9 ML/MIN/1.73
FOLATE SERPL-MCNC: >20 NG/ML (ref 4.78–24.2)
GLUCOSE SERPL-MCNC: 110 MG/DL (ref 65–99)
HBA1C MFR BLD: 5.9 % (ref 4.8–5.6)
HDLC SERPL-MCNC: 54 MG/DL (ref 40–60)
IMP & REVIEW OF LAB RESULTS: NORMAL
LDLC SERPL CALC-MCNC: 62 MG/DL (ref 0–100)
POTASSIUM SERPL-SCNC: 4.3 MMOL/L (ref 3.5–5.2)
SODIUM SERPL-SCNC: 141 MMOL/L (ref 136–145)
T4 FREE SERPL-MCNC: 1.32 NG/DL (ref 0.93–1.7)
TRIGL SERPL-MCNC: 259 MG/DL (ref 0–150)
TSH SERPL DL<=0.005 MIU/L-ACNC: 1.96 UIU/ML (ref 0.27–4.2)
VIT B12 SERPL-MCNC: 241 PG/ML (ref 211–946)
VLDLC SERPL CALC-MCNC: 41 MG/DL (ref 5–40)

## 2022-12-16 ENCOUNTER — OFFICE VISIT (OUTPATIENT)
Dept: ENDOCRINOLOGY | Age: 72
End: 2022-12-16

## 2022-12-16 VITALS
SYSTOLIC BLOOD PRESSURE: 120 MMHG | DIASTOLIC BLOOD PRESSURE: 70 MMHG | TEMPERATURE: 95.9 F | OXYGEN SATURATION: 97 % | HEIGHT: 67 IN | HEART RATE: 78 BPM | WEIGHT: 202 LBS | BODY MASS INDEX: 31.71 KG/M2

## 2022-12-16 DIAGNOSIS — E78.5 HYPERLIPIDEMIA ASSOCIATED WITH TYPE 2 DIABETES MELLITUS: ICD-10-CM

## 2022-12-16 DIAGNOSIS — E11.9 TYPE 2 DIABETES MELLITUS WITHOUT COMPLICATION, WITHOUT LONG-TERM CURRENT USE OF INSULIN: Primary | ICD-10-CM

## 2022-12-16 DIAGNOSIS — E55.9 VITAMIN D DEFICIENCY: ICD-10-CM

## 2022-12-16 DIAGNOSIS — E11.69 HYPERLIPIDEMIA ASSOCIATED WITH TYPE 2 DIABETES MELLITUS: ICD-10-CM

## 2022-12-16 DIAGNOSIS — E11.42 DIABETIC PERIPHERAL NEUROPATHY: ICD-10-CM

## 2022-12-16 PROCEDURE — 99214 OFFICE O/P EST MOD 30 MIN: CPT | Performed by: NURSE PRACTITIONER

## 2022-12-16 RX ORDER — BLOOD-GLUCOSE METER
KIT MISCELLANEOUS
Qty: 1 EACH | Refills: 0 | Status: SHIPPED | OUTPATIENT
Start: 2022-12-16

## 2022-12-16 RX ORDER — ROSUVASTATIN CALCIUM 20 MG/1
20 TABLET, COATED ORAL NIGHTLY
Qty: 90 TABLET | Refills: 0 | Status: SHIPPED | OUTPATIENT
Start: 2022-12-16 | End: 2023-12-16

## 2022-12-16 RX ORDER — ERGOCALCIFEROL 1.25 MG/1
50000 CAPSULE ORAL WEEKLY
Qty: 12 CAPSULE | Refills: 1 | Status: SHIPPED | OUTPATIENT
Start: 2022-12-16

## 2022-12-16 RX ORDER — EMPAGLIFLOZIN, METFORMIN HYDROCHLORIDE 12.5; 1 MG/1; MG/1
2 TABLET, EXTENDED RELEASE ORAL DAILY
Qty: 180 TABLET | Refills: 1 | Status: SHIPPED | OUTPATIENT
Start: 2022-12-16

## 2022-12-16 RX ORDER — LANCETS 30 GAUGE
EACH MISCELLANEOUS
Qty: 300 EACH | Refills: 4 | Status: SHIPPED | OUTPATIENT
Start: 2022-12-16 | End: 2023-03-09 | Stop reason: SDUPTHER

## 2022-12-16 NOTE — PROGRESS NOTES
"Chief Complaint  Diabetes (Type2: Patient has meter with her today she has no hx of retinopathy with a mild case of neuropathy in her toes )    Subjective        Delaney Dunlap presents to [facility] ENDOCRINOLOGY  History of Present Illness     Type 2 dm    Diagnosed about 5+ years ago   Today in clinic pt reports being on Synjardy XR 12.5-1000mg BID   PMH: pancreatitis, Glaucoma and mac degen   Avg bg - < 120   Denies CKD, CAD and CVA  Dm neuropathy -yes, stable   Episodes of hypoglycemia - denies     Lab Results   Component Value Date    CHOL 164 08/03/2021    CHLPL 157 12/09/2022    TRIG 259 (H) 12/09/2022    HDL 54 12/09/2022    LDL 62 12/09/2022       Objective   Vital Signs:  /70   Pulse 78   Temp 95.9 °F (35.5 °C) (Temporal)   Ht 170.2 cm (67\")   Wt 91.6 kg (202 lb)   SpO2 97%   BMI 31.64 kg/m²   Estimated body mass index is 31.64 kg/m² as calculated from the following:    Height as of this encounter: 170.2 cm (67\").    Weight as of this encounter: 91.6 kg (202 lb).          Physical Exam  Vitals reviewed.   Constitutional:       General: She is not in acute distress.  HENT:      Head: Normocephalic and atraumatic.   Eyes:      General:         Right eye: No discharge.         Left eye: No discharge.   Pulmonary:      Effort: Pulmonary effort is normal. No respiratory distress.   Musculoskeletal:         General: No deformity or signs of injury.      Cervical back: Normal range of motion and neck supple.   Skin:     General: Skin is warm and dry.   Neurological:      Mental Status: She is alert and oriented to person, place, and time. Mental status is at baseline.   Psychiatric:         Mood and Affect: Mood normal.         Behavior: Behavior normal.         Thought Content: Thought content normal.         Judgment: Judgment normal.        Result Review :  The following data was reviewed by: MEHUL Francis on 12/16/2022:  Common labs    Common Labs 3/3/22 3/3/22 3/3/22 " 3/3/22 6/8/22 6/8/22 6/8/22 6/8/22 12/9/22 12/9/22 12/9/22    1026 1026 1026 1026 1048 1048 1048 1048 1021 1021 1021   Glucose  111 (A)   100 (A)      110 (A)   BUN  15   15      14   Creatinine  0.66   0.69      0.66   Sodium  143   142      141   Potassium  4.4   4.3      4.3   Chloride  104   104      104   Calcium  9.9   10.0      10.1   Total Protein  7.2   7.3         Albumin  5.0 (A)   5.1 (A)         Total Bilirubin  0.4   0.4         Alkaline Phosphatase  64   65         AST (SGOT)  17   18         ALT (SGPT)  18   19         Total Cholesterol   174   175   157     Triglycerides   281 (A)   274 (A)   259 (A)     HDL Cholesterol   51   49   54     LDL Cholesterol    78   81   62     Hemoglobin A1C 5.7 (A)       5.9 (A)  5.90 (A)    Microalbumin, Urine    12.2   17.0       (A) Abnormal value       Comments are available for some flowsheets but are not being displayed.                     Assessment and Plan   Diagnoses and all orders for this visit:    1. Type 2 diabetes mellitus without complication, without long-term current use of insulin (HCC) (Primary)  -     TSH; Future  -     T4, Free; Future  -     Hemoglobin A1c; Future  -     Comprehensive Metabolic Panel; Future  -     Lipid Panel; Future  -     Microalbumin / Creatinine Urine Ratio - Urine, Clean Catch; Future    2. Vitamin D deficiency  -     vitamin D (ERGOCALCIFEROL) 1.25 MG (25411 UT) capsule capsule; Take 1 capsule by mouth 1 (One) Time Per Week.  Dispense: 12 capsule; Refill: 1    3. Hyperlipidemia associated with type 2 diabetes mellitus (HCC)  -     TSH; Future  -     T4, Free; Future  -     Hemoglobin A1c; Future  -     Comprehensive Metabolic Panel; Future  -     Lipid Panel; Future  -     Microalbumin / Creatinine Urine Ratio - Urine, Clean Catch; Future    4. Diabetic peripheral neuropathy (HCC)  -     TSH; Future  -     T4, Free; Future  -     Hemoglobin A1c; Future  -     Comprehensive Metabolic Panel; Future  -     Lipid Panel;  Future  -     Microalbumin / Creatinine Urine Ratio - Urine, Clean Catch; Future    Other orders  -     Synjardy XR 12.5-1000 MG tablet sustained-release 24 hour; Take 2 tablets by mouth Daily.  Dispense: 180 tablet; Refill: 1  -     rosuvastatin (CRESTOR) 20 MG tablet; Take 1 tablet by mouth Every Night.  Dispense: 90 tablet; Refill: 0  -     Lancets misc; Dispense based on insurance preference: Check 2 times a day. Dx: E 11.9  Dispense: 300 each; Refill: 4  -     glucose monitor monitoring kit; Dispense one kit based on insurance preference and related supplies to check 2 times a day. Dx: E 11.9  Dispense: 1 each; Refill: 0  -     glucose blood test strip; Dispense based on insurance preference: Check 2 times a day Dx: E 11.9  Dispense: 300 each; Refill: 4             Follow Up   No follow-ups on file.     Ok to change synjardy to once a day, if BS worsen, ok to resume BID    a1c at goal     Patient was given instructions and counseling regarding her condition or for health maintenance advice. Please see specific information pulled into the AVS if appropriate.     MEHUL Francis

## 2023-01-03 RX ORDER — DIGOXIN 250 MCG
250 TABLET ORAL DAILY
Qty: 30 TABLET | Refills: 3 | Status: SHIPPED | OUTPATIENT
Start: 2023-01-03

## 2023-01-18 ENCOUNTER — TELEPHONE (OUTPATIENT)
Dept: CARDIOLOGY | Facility: CLINIC | Age: 73
End: 2023-01-18

## 2023-01-18 NOTE — TELEPHONE ENCOUNTER
Caller: Delaney Dunlap    Relationship to patient: Self    Best call back number: [phone]    Patient is needing: PT RECEIVED A LETTER FROM HER INSURANCE COMPANY STATING THAT PT NEEDED TO EITHER FIND AN ALTERNATIVE MEDICATION FOR HER DIGOXIN OR GET AN APPROVAL LETTER FROM HER PROVIDER.        Health Maintenance Summary     Topic Due On Due Status Completed On Postpone Until Reason    PAP SMEAR - CERVICAL CANCER SCREENING Dec 7, 2018 Not Due Dec 7, 2015      Immunization - Td/Tdap Apr 7, 2021 Not Due Apr 7, 2011      Immunization - TDAP Pregnancy  Hidden       Immunization-Influenza Sep 1, 2016 Postponed  Jan 25, 2018 Patient Refused          Patient is due for topics as listed above, she wishes to discuss with provider  Pt. Signed declination for flu shot .

## 2023-02-08 ENCOUNTER — TELEPHONE (OUTPATIENT)
Dept: FAMILY MEDICINE CLINIC | Facility: CLINIC | Age: 73
End: 2023-02-08
Payer: MEDICARE

## 2023-02-08 NOTE — TELEPHONE ENCOUNTER
Hub to read- patient due medicare wellness visit. Please schedule with Dr. Aiden Simmons or next available for any provider

## 2023-03-09 RX ORDER — LANCETS 30 GAUGE
EACH MISCELLANEOUS
Qty: 100 EACH | Refills: 3 | Status: SHIPPED | OUTPATIENT
Start: 2023-03-09

## 2023-04-25 NOTE — TELEPHONE ENCOUNTER
Rx Refill Note  Requested Prescriptions     Pending Prescriptions Disp Refills   • digoxin (LANOXIN) 250 MCG tablet [Pharmacy Med Name: Digoxin 250 MCG Oral Tablet] 30 tablet 0     Sig: Take 1 tablet by mouth once daily      Last office visit with prescribing clinician: 8/10/2021   Last telemedicine visit with prescribing clinician: Visit date not found   Next office visit with prescribing clinician: Visit date not found                         Would you like a call back once the refill request has been completed: [] Yes [] No    If the office needs to give you a call back, can they leave a voicemail: [] Yes [] No    Annie Obando MA  04/25/23, 14:38 EDT

## 2023-04-27 RX ORDER — DIGOXIN 250 MCG
TABLET ORAL
Qty: 30 TABLET | Refills: 0 | Status: SHIPPED | OUTPATIENT
Start: 2023-04-27

## 2023-05-30 RX ORDER — DIGOXIN 250 MCG
TABLET ORAL
Qty: 30 TABLET | Refills: 5 | Status: SHIPPED | OUTPATIENT
Start: 2023-05-30

## 2023-05-30 RX ORDER — ROSUVASTATIN CALCIUM 20 MG/1
TABLET, COATED ORAL
Qty: 90 TABLET | Refills: 0 | Status: SHIPPED | OUTPATIENT
Start: 2023-05-30

## 2023-05-30 NOTE — TELEPHONE ENCOUNTER
Rx Refill Note  Requested Prescriptions     Pending Prescriptions Disp Refills   • rosuvastatin (CRESTOR) 20 MG tablet [Pharmacy Med Name: Rosuvastatin Calcium 20 MG Oral Tablet] 90 tablet 0     Sig: TAKE 1 TABLET BY MOUTH ONCE DAILY AT NIGHT      Last office visit with prescribing clinician: 12/16/2022   Last telemedicine visit with prescribing clinician: Visit date not found   Next office visit with prescribing clinician: 6/12/2023                         Would you like a call back once the refill request has been completed: [] Yes [] No    If the office needs to give you a call back, can they leave a voicemail: [] Yes [] No    Donya Quick MA  05/30/23, 15:27 EDT

## 2023-06-05 ENCOUNTER — LAB (OUTPATIENT)
Dept: ENDOCRINOLOGY | Age: 73
End: 2023-06-05
Payer: MEDICARE

## 2023-06-05 DIAGNOSIS — E11.42 DIABETIC PERIPHERAL NEUROPATHY: ICD-10-CM

## 2023-06-05 DIAGNOSIS — E78.5 HYPERLIPIDEMIA ASSOCIATED WITH TYPE 2 DIABETES MELLITUS: ICD-10-CM

## 2023-06-05 DIAGNOSIS — E11.9 TYPE 2 DIABETES MELLITUS WITHOUT COMPLICATION, WITHOUT LONG-TERM CURRENT USE OF INSULIN: ICD-10-CM

## 2023-06-05 DIAGNOSIS — E11.69 HYPERLIPIDEMIA ASSOCIATED WITH TYPE 2 DIABETES MELLITUS: ICD-10-CM

## 2023-06-06 LAB
ALBUMIN SERPL-MCNC: 5.1 G/DL (ref 3.5–5.2)
ALBUMIN/CREAT UR: 23 MG/G CREAT (ref 0–29)
ALBUMIN/GLOB SERPL: 2.4 G/DL
ALP SERPL-CCNC: 61 U/L (ref 39–117)
ALT SERPL-CCNC: 27 U/L (ref 1–33)
AST SERPL-CCNC: 19 U/L (ref 1–32)
BILIRUB SERPL-MCNC: 0.5 MG/DL (ref 0–1.2)
BUN SERPL-MCNC: 11 MG/DL (ref 8–23)
BUN/CREAT SERPL: 14.9 (ref 7–25)
CALCIUM SERPL-MCNC: 10.4 MG/DL (ref 8.6–10.5)
CHLORIDE SERPL-SCNC: 107 MMOL/L (ref 98–107)
CHOLEST SERPL-MCNC: 172 MG/DL (ref 0–200)
CO2 SERPL-SCNC: 25.3 MMOL/L (ref 22–29)
CREAT SERPL-MCNC: 0.74 MG/DL (ref 0.57–1)
CREAT UR-MCNC: 115.4 MG/DL
EGFRCR SERPLBLD CKD-EPI 2021: 86.6 ML/MIN/1.73
GLOBULIN SER CALC-MCNC: 2.1 GM/DL
GLUCOSE SERPL-MCNC: 115 MG/DL (ref 65–99)
HBA1C MFR BLD: 5.7 % (ref 4.8–5.6)
HDLC SERPL-MCNC: 55 MG/DL (ref 40–60)
IMP & REVIEW OF LAB RESULTS: NORMAL
LDLC SERPL CALC-MCNC: 80 MG/DL (ref 0–100)
MICROALBUMIN UR-MCNC: 26.6 UG/ML
POTASSIUM SERPL-SCNC: 4.4 MMOL/L (ref 3.5–5.2)
PROT SERPL-MCNC: 7.2 G/DL (ref 6–8.5)
SODIUM SERPL-SCNC: 143 MMOL/L (ref 136–145)
T4 FREE SERPL-MCNC: 1.29 NG/DL (ref 0.93–1.7)
TRIGL SERPL-MCNC: 225 MG/DL (ref 0–150)
TSH SERPL DL<=0.005 MIU/L-ACNC: 1.48 UIU/ML (ref 0.27–4.2)
VLDLC SERPL CALC-MCNC: 37 MG/DL (ref 5–40)

## 2023-06-12 ENCOUNTER — OFFICE VISIT (OUTPATIENT)
Dept: ENDOCRINOLOGY | Age: 73
End: 2023-06-12
Payer: MEDICARE

## 2023-06-12 VITALS
HEIGHT: 67 IN | OXYGEN SATURATION: 98 % | TEMPERATURE: 96.4 F | WEIGHT: 205 LBS | HEART RATE: 76 BPM | SYSTOLIC BLOOD PRESSURE: 122 MMHG | BODY MASS INDEX: 32.18 KG/M2 | DIASTOLIC BLOOD PRESSURE: 64 MMHG

## 2023-06-12 DIAGNOSIS — E78.5 HYPERLIPIDEMIA ASSOCIATED WITH TYPE 2 DIABETES MELLITUS: ICD-10-CM

## 2023-06-12 DIAGNOSIS — E11.42 DIABETIC PERIPHERAL NEUROPATHY: ICD-10-CM

## 2023-06-12 DIAGNOSIS — E11.69 HYPERLIPIDEMIA ASSOCIATED WITH TYPE 2 DIABETES MELLITUS: ICD-10-CM

## 2023-06-12 DIAGNOSIS — E11.9 TYPE 2 DIABETES MELLITUS WITHOUT COMPLICATION, WITHOUT LONG-TERM CURRENT USE OF INSULIN: Primary | ICD-10-CM

## 2023-06-12 PROCEDURE — 99214 OFFICE O/P EST MOD 30 MIN: CPT | Performed by: NURSE PRACTITIONER

## 2023-06-12 PROCEDURE — 3044F HG A1C LEVEL LT 7.0%: CPT | Performed by: NURSE PRACTITIONER

## 2023-06-12 RX ORDER — EMPAGLIFLOZIN, METFORMIN HYDROCHLORIDE 12.5; 1 MG/1; MG/1
1 TABLET, EXTENDED RELEASE ORAL DAILY
Qty: 90 TABLET | Refills: 0
Start: 2023-06-12

## 2023-06-12 NOTE — PROGRESS NOTES
"Chief Complaint  Diabetes (TESTING BS 2 X DAY )    Subjective        Delaney Dunlap presents to [facility] ENDOCRINOLOGY  History of Present Illness    Type 2 dm    Diagnosed about 5+ years ago   Today in clinic pt reports being on Synjardy XR 12.5-1000mg QD  PMH: pancreatitis, Glaucoma and mac degen   Denies CKD, CAD and CVA  Dm neuropathy -yes  On statin     Objective   Vital Signs:  /64   Pulse 76   Temp 96.4 °F (35.8 °C) (Temporal)   Ht 170.2 cm (67.01\")   Wt 93 kg (205 lb)   SpO2 98%   BMI 32.10 kg/m²   Estimated body mass index is 32.1 kg/m² as calculated from the following:    Height as of this encounter: 170.2 cm (67.01\").    Weight as of this encounter: 93 kg (205 lb).             Physical Exam  Vitals reviewed.   Constitutional:       General: She is not in acute distress.  HENT:      Head: Normocephalic and atraumatic.   Cardiovascular:      Rate and Rhythm: Normal rate.   Pulmonary:      Effort: Pulmonary effort is normal. No respiratory distress.   Musculoskeletal:         General: No signs of injury. Normal range of motion.      Cervical back: Normal range of motion and neck supple.   Skin:     General: Skin is warm and dry.   Neurological:      Mental Status: She is alert and oriented to person, place, and time. Mental status is at baseline.   Psychiatric:         Mood and Affect: Mood normal.         Behavior: Behavior normal.         Thought Content: Thought content normal.         Judgment: Judgment normal.      Result Review :  The following data was reviewed by: MEHUL Francis on 06/12/2023:  Common labs          12/9/2022    10:21 6/5/2023    10:02   Common Labs   Glucose 110  115    BUN 14  11    Creatinine 0.66  0.74    Sodium 141  143    Potassium 4.3  4.4    Chloride 104  107    Calcium 10.1  10.4    Total Protein  7.2    Albumin  5.1    Total Bilirubin  0.5    Alkaline Phosphatase  61    AST (SGOT)  19    ALT (SGPT)  27    Total Cholesterol 157  172  "   Triglycerides 259  225    HDL Cholesterol 54  55    LDL Cholesterol  62  80    Hemoglobin A1C 5.90  5.70    Microalbumin, Urine  26.6                   Assessment and Plan   Diagnoses and all orders for this visit:    1. Type 2 diabetes mellitus without complication, without long-term current use of insulin (Primary)  -     Hemoglobin A1c; Future  -     Basic Metabolic Panel; Future    2. Hyperlipidemia associated with type 2 diabetes mellitus    3. Diabetic peripheral neuropathy    Other orders  -     Synjardy XR 12.5-1000 MG tablet sustained-release 24 hour; Take 1 tablet by mouth Daily.  Dispense: 90 tablet; Refill: 0             Follow Up   Return in about 6 months (around 12/12/2023).    A1c at goal with increased urination jardiance- just got a 90 day supply and wants to finish this medication- will consider changing to metformin in 3 months and assessing glycemic control at that time  LDL at goal, continue statin     Patient was given instructions and counseling regarding her condition or for health maintenance advice. Please see specific information pulled into the AVS if appropriate.     MEHUL Francis

## 2023-08-28 RX ORDER — ROSUVASTATIN CALCIUM 20 MG/1
TABLET, COATED ORAL
Qty: 90 TABLET | Refills: 0 | Status: SHIPPED | OUTPATIENT
Start: 2023-08-28

## 2023-08-28 NOTE — TELEPHONE ENCOUNTER
Rx Refill Note  Requested Prescriptions     Pending Prescriptions Disp Refills    rosuvastatin (CRESTOR) 20 MG tablet [Pharmacy Med Name: Rosuvastatin Calcium 20 MG Oral Tablet] 90 tablet 0     Sig: TAKE 1 TABLET BY MOUTH ONCE DAILY AT NIGHT      Last office visit with prescribing clinician: Visit date not found   Last telemedicine visit with prescribing clinician: Visit date not found   Next office visit with prescribing clinician: Visit date not found                         Would you like a call back once the refill request has been completed: [] Yes [] No    If the office needs to give you a call back, can they leave a voicemail: [] Yes [] No    Shira Bustillo  08/28/23, 15:46 EDT

## 2023-09-15 RX ORDER — ROSUVASTATIN CALCIUM 20 MG/1
TABLET, COATED ORAL
Qty: 90 TABLET | Refills: 0 | Status: SHIPPED | OUTPATIENT
Start: 2023-09-15

## 2023-09-18 RX ORDER — ERGOCALCIFEROL 1.25 MG/1
50000 CAPSULE ORAL WEEKLY
Qty: 12 CAPSULE | Refills: 0 | OUTPATIENT
Start: 2023-09-18

## 2023-11-07 ENCOUNTER — OFFICE VISIT (OUTPATIENT)
Dept: CARDIOLOGY | Facility: CLINIC | Age: 73
End: 2023-11-07
Payer: MEDICARE

## 2023-11-07 VITALS
BODY MASS INDEX: 32.33 KG/M2 | HEART RATE: 80 BPM | WEIGHT: 206 LBS | DIASTOLIC BLOOD PRESSURE: 78 MMHG | SYSTOLIC BLOOD PRESSURE: 141 MMHG | HEIGHT: 67 IN

## 2023-11-07 DIAGNOSIS — I47.10 SUPRAVENTRICULAR TACHYCARDIA: ICD-10-CM

## 2023-11-07 DIAGNOSIS — E78.5 HYPERLIPIDEMIA, UNSPECIFIED HYPERLIPIDEMIA TYPE: Primary | ICD-10-CM

## 2023-11-07 PROCEDURE — 93000 ELECTROCARDIOGRAM COMPLETE: CPT

## 2023-11-07 PROCEDURE — 99214 OFFICE O/P EST MOD 30 MIN: CPT

## 2023-11-07 RX ORDER — DIGOXIN 250 MCG
250 TABLET ORAL DAILY
Qty: 90 TABLET | Refills: 3 | Status: SHIPPED | OUTPATIENT
Start: 2023-11-07

## 2023-11-07 NOTE — PROGRESS NOTES
Subjective:        Delaney Dunlap is a 73 y.o. female who here for follow up    No chief complaint on file.      HPI    This is a 73-year-old female with PSVT, hyperlipidemia, diabetes mellitus type 2.  She follows up in office today for routine follow-up and management of PSVT. Echo 10/24/2022 EF 56%, normal LV function.  Calcification of the aortic valve.  Stress test 2020 myocardial perfusion imaging fits a normal study with no evidence of ischemia.    The following portions of the patient's history were reviewed and updated as appropriate: allergies, current medications, past family history, past medical history, past social history, past surgical history and problem list.    Past Medical History:   Diagnosis Date    Diabetes mellitus     Glaucoma     Hyperlipidemia     Pancreatitis     SVT (supraventricular tachycardia)     Type 2 diabetes mellitus          reports that she has never smoked. She has never been exposed to tobacco smoke. She has never used smokeless tobacco. She reports that she does not currently use alcohol. She reports that she does not use drugs.     Family History   Problem Relation Age of Onset    Heart attack Mother     Glaucoma Mother     Arrhythmia Father     Angina Father     Glaucoma Sister     Breast cancer Sister     Cancer Sister     Colon cancer Brother     Cancer Brother     Diabetes Brother     Hypertension Brother     Obesity Brother         half-brother    Obesity Maternal Grandmother     Cancer Paternal Grandmother        ROS     Review of Systems  Constitutional: No wt loss, fever, fatigue  Gastrointestinal: No nausea, abdominal pain  Behavioral/Psych: No insomnia or anxiety  Cardiovascular no chest pain or shortness of breath      Objective:           Vitals and nursing note reviewed.   Constitutional:       Appearance: Well-developed.   HENT:      Head: Normocephalic.      Right Ear: External ear normal.      Left Ear: External ear normal.   Neck:      Vascular: No JVD.    Pulmonary:      Effort: Pulmonary effort is normal. No respiratory distress.      Breath sounds: Normal breath sounds. No stridor. No rales.   Cardiovascular:      Normal rate. Regular rhythm.      No gallop.    Pulses:     Intact distal pulses.   Edema:     Peripheral edema absent.   Abdominal:      General: Bowel sounds are normal. There is no distension.      Palpations: Abdomen is soft.      Tenderness: There is no abdominal tenderness. There is no guarding.   Musculoskeletal: Normal range of motion.         General: No tenderness.      Cervical back: Normal range of motion. Skin:     General: Skin is warm.   Neurological:      Mental Status: Alert and oriented to person, place, and time.      Deep Tendon Reflexes: Reflexes are normal and symmetric.   Psychiatric:         Judgment: Judgment normal.           ECG 12 Lead    Date/Time: 11/7/2023 11:47 AM  Performed by: Giselle Lau APRN    Authorized by: Giselle Lau APRN  Comparison: compared with previous ECG from 11/10/2022  Similar to previous ECG  Rhythm: sinus rhythm  Rate: normal  BPM: 72    Clinical impression: normal ECG          Interpretation Summary       Moderate risk for ischemic heart disease.  Findings consistent with an abnormal ECG stress test.  Left ventricular ejection fraction is normal (Calculated EF = 66%).  Myocardial perfusion imaging indicates a normal myocardial perfusion study with no evidence of ischemia.  Impressions are consistent with a low risk study.    Interpretation Summary         Left ventricular ejection fraction appears to be 56 - 60%.    There is calcification of the aortic valve.         Current Outpatient Medications:     digoxin (LANOXIN) 250 MCG tablet, Take 1 tablet by mouth once daily, Disp: 30 tablet, Rfl: 5    glucose blood test strip, Ins will only cover testing 1 x day Dx: E 11.9, Disp: 100 each, Rfl: 3    glucose monitor monitoring kit, Dispense one kit based on insurance preference and related  supplies to check 2 times a day. Dx: E 11.9, Disp: 1 each, Rfl: 0    Lancets misc, Testing bs 1 x day . Dx: E 11.9 insurance will only cover testing 1 x day, Disp: 100 each, Rfl: 3    latanoprost (XALATAN) 0.005 % ophthalmic solution, INT 1 GTT IN OU Q NIGHT, Disp: , Rfl:     Multiple Vitamins-Minerals (MULTIVITAMIN WOMEN 50+) tablet, Take 1 tablet by mouth Daily., Disp: , Rfl:     multivitamins-minerals (PRESERVISION AREDS 2) capsule capsule, Take 1 capsule by mouth 2 (Two) Times a Day., Disp: , Rfl:     Omega-3 1000 MG capsule, , Disp: , Rfl:     rosuvastatin (CRESTOR) 20 MG tablet, TAKE 1 TABLET BY MOUTH ONCE DAILY AT NIGHT, Disp: 90 tablet, Rfl: 0    Synjardy XR 12.5-1000 MG tablet sustained-release 24 hour, Take 1 tablet by mouth Daily., Disp: 90 tablet, Rfl: 0     Assessment:        Patient Active Problem List   Diagnosis    Hyperlipidemia    Premature atrial contraction    Supraventricular tachycardia    Dyslipidemia    Murmur    Borderline systolic HTN    Chronic fatigue    Type 2 diabetes mellitus without complication, without long-term current use of insulin    Vitamin D deficiency    Left-sided carotid artery disease    Aortic valve disease    Abnormal weight gain    Diabetic peripheral neuropathy    Coronary artery disease involving native coronary artery of native heart without angina pectoris               Plan:   1.  PSVT: Controlled on digoxin, continue. Refilled digoxin.      2.  Hyperlipidemia: She reports her endocrinologist manages her cholesterol labs.  Continue rosuvastatin.    Risk of the hyperlipidemia, importance of the treatment has been explained. Pros and cons of the statins has been explained. Regular blood workup as well as side effects including the liver failure, myelopathy death has been explained.                 No diagnosis found.    There are no diagnoses linked to this encounter.    COUNSELING: Shaq Kumarieling was given to patient for the following topics:  diagnostic results, risk factor reductions, impressions, risks and benefits of treatment options and importance of treatment compliance .       SMOKING COUNSELING:denies    Follow-up in 1 year or sooner if needed.     Sincerely,   MEHUL Smart  [facility]  11/06/23  21:34 EST    EMR Dragon/Transcription disclaimer:   Much of this encounter note is an electronic transcription/translation of spoken language to printed text. The electronic translation of spoken language may permit erroneous, or at times, nonsensical words or phrases to be inadvertently transcribed; Although I have reviewed the note for such errors, some may still exist.

## 2023-11-28 DIAGNOSIS — E11.9 TYPE 2 DIABETES MELLITUS WITHOUT COMPLICATION, WITHOUT LONG-TERM CURRENT USE OF INSULIN: ICD-10-CM

## 2023-11-29 LAB
BUN SERPL-MCNC: 11 MG/DL (ref 8–23)
BUN/CREAT SERPL: 15.7 (ref 7–25)
CALCIUM SERPL-MCNC: 9.8 MG/DL (ref 8.6–10.5)
CHLORIDE SERPL-SCNC: 101 MMOL/L (ref 98–107)
CO2 SERPL-SCNC: 25.6 MMOL/L (ref 22–29)
CREAT SERPL-MCNC: 0.7 MG/DL (ref 0.57–1)
EGFRCR SERPLBLD CKD-EPI 2021: 92 ML/MIN/1.73
GLUCOSE SERPL-MCNC: 122 MG/DL (ref 65–99)
HBA1C MFR BLD: 6 % (ref 4.8–5.6)
POTASSIUM SERPL-SCNC: 4.1 MMOL/L (ref 3.5–5.2)
SODIUM SERPL-SCNC: 138 MMOL/L (ref 136–145)

## 2023-12-12 ENCOUNTER — OFFICE VISIT (OUTPATIENT)
Dept: ENDOCRINOLOGY | Age: 73
End: 2023-12-12
Payer: MEDICARE

## 2023-12-12 VITALS
WEIGHT: 201.8 LBS | SYSTOLIC BLOOD PRESSURE: 130 MMHG | OXYGEN SATURATION: 98 % | DIASTOLIC BLOOD PRESSURE: 70 MMHG | HEART RATE: 76 BPM | BODY MASS INDEX: 31.61 KG/M2 | TEMPERATURE: 96.6 F

## 2023-12-12 DIAGNOSIS — Z79.4 TYPE 2 DIABETES MELLITUS WITH HYPERGLYCEMIA, WITH LONG-TERM CURRENT USE OF INSULIN: Primary | ICD-10-CM

## 2023-12-12 DIAGNOSIS — E78.5 HYPERLIPIDEMIA ASSOCIATED WITH TYPE 2 DIABETES MELLITUS: ICD-10-CM

## 2023-12-12 DIAGNOSIS — E11.42 DIABETIC PERIPHERAL NEUROPATHY: ICD-10-CM

## 2023-12-12 DIAGNOSIS — E11.69 HYPERLIPIDEMIA ASSOCIATED WITH TYPE 2 DIABETES MELLITUS: ICD-10-CM

## 2023-12-12 DIAGNOSIS — E11.65 TYPE 2 DIABETES MELLITUS WITH HYPERGLYCEMIA, WITH LONG-TERM CURRENT USE OF INSULIN: Primary | ICD-10-CM

## 2023-12-12 PROCEDURE — 3044F HG A1C LEVEL LT 7.0%: CPT | Performed by: NURSE PRACTITIONER

## 2023-12-12 PROCEDURE — 99214 OFFICE O/P EST MOD 30 MIN: CPT | Performed by: NURSE PRACTITIONER

## 2023-12-12 RX ORDER — EMPAGLIFLOZIN, METFORMIN HYDROCHLORIDE 12.5; 1 MG/1; MG/1
1 TABLET, EXTENDED RELEASE ORAL DAILY
Qty: 90 TABLET | Refills: 1 | Status: SHIPPED | OUTPATIENT
Start: 2023-12-12

## 2023-12-12 NOTE — PROGRESS NOTES
"Chief Complaint  Diabetes (Checks blood sugars 2x/day.)    Subjective        Delaney Dunlap presents to [facility] ENDOCRINOLOGY  History of Present Illness    Type 2 dm    Diagnosed about 5+ years ago   Today in clinic pt reports being on Synjardy XR 12.5-1000mg QD  Feels like her night urination is more related to her beverage intake   PMH: pancreatitis, Glaucoma and mac degen   Next exam upcoming in Jan 2024  Follows with cardiology  (+)Dm neuropathy  On statin       Objective   Vital Signs:  /70   Pulse 76   Temp 96.6 °F (35.9 °C) (Temporal)   Wt 91.5 kg (201 lb 12.8 oz)   SpO2 98%   BMI 31.61 kg/m²   Estimated body mass index is 31.61 kg/m² as calculated from the following:    Height as of 11/7/23: 170.2 cm (67\").    Weight as of this encounter: 91.5 kg (201 lb 12.8 oz).             Physical Exam  Vitals reviewed.   Constitutional:       General: She is not in acute distress.  HENT:      Head: Normocephalic and atraumatic.   Cardiovascular:      Rate and Rhythm: Normal rate and regular rhythm.   Pulmonary:      Effort: Pulmonary effort is normal. No respiratory distress.   Musculoskeletal:         General: No signs of injury. Normal range of motion.      Cervical back: Normal range of motion and neck supple.   Skin:     General: Skin is warm and dry.   Neurological:      Mental Status: She is alert and oriented to person, place, and time. Mental status is at baseline.   Psychiatric:         Mood and Affect: Mood normal.         Behavior: Behavior normal.         Thought Content: Thought content normal.         Judgment: Judgment normal.            Result Review :  The following data was reviewed by: MEHUL Francis on 12/12/2023:  Common labs          6/5/2023    10:02 11/28/2023    09:52   Common Labs   Glucose 115  122    BUN 11  11    Creatinine 0.74  0.70    Sodium 143  138    Potassium 4.4  4.1    Chloride 107  101    Calcium 10.4  9.8    Total Protein 7.2     Albumin " 5.1     Total Bilirubin 0.5     Alkaline Phosphatase 61     AST (SGOT) 19     ALT (SGPT) 27     Total Cholesterol 172     Triglycerides 225     HDL Cholesterol 55     LDL Cholesterol  80     Hemoglobin A1C 5.70  6.00    Microalbumin, Urine 26.6                    Assessment and Plan   Diagnoses and all orders for this visit:    1. Type 2 diabetes mellitus with hyperglycemia, with long-term current use of insulin (Primary)  -     Hemoglobin A1c; Future  -     Comprehensive Metabolic Panel; Future  -     Lipid Panel; Future  -     Microalbumin / Creatinine Urine Ratio - Urine, Clean Catch; Future    2. Hyperlipidemia associated with type 2 diabetes mellitus  -     Hemoglobin A1c; Future  -     Comprehensive Metabolic Panel; Future  -     Lipid Panel; Future  -     Microalbumin / Creatinine Urine Ratio - Urine, Clean Catch; Future    3. Diabetic peripheral neuropathy  -     Hemoglobin A1c; Future  -     Comprehensive Metabolic Panel; Future  -     Lipid Panel; Future  -     Microalbumin / Creatinine Urine Ratio - Urine, Clean Catch; Future    Other orders  -     Synjardy XR 12.5-1000 MG tablet sustained-release 24 hour; Take 1 tablet by mouth Daily.  Dispense: 90 tablet; Refill: 1             Follow Up   Return in about 6 months (around 6/12/2024).    A1c at goal, continue synjardy  Daily diabetic foot care  Labs before next visit  Continue statin    Patient was given instructions and counseling regarding her condition or for health maintenance advice. Please see specific information pulled into the AVS if appropriate.     MEHUL Francis

## 2024-02-29 RX ORDER — ROSUVASTATIN CALCIUM 20 MG/1
TABLET, COATED ORAL
Qty: 90 TABLET | Refills: 0 | Status: SHIPPED | OUTPATIENT
Start: 2024-02-29

## 2024-02-29 RX ORDER — LANCETS 33 GAUGE
EACH MISCELLANEOUS
Qty: 100 EACH | Refills: 12 | Status: SHIPPED | OUTPATIENT
Start: 2024-02-29

## 2024-02-29 NOTE — TELEPHONE ENCOUNTER
Rx Refill Note  Requested Prescriptions     Pending Prescriptions Disp Refills    rosuvastatin (CRESTOR) 20 MG tablet [Pharmacy Med Name: Rosuvastatin Calcium 20 MG Oral Tablet] 90 tablet 0     Sig: TAKE 1 TABLET BY MOUTH ONCE DAILY AT NIGHT    Lancets (OneTouch Delica Plus [id number]) misc [Pharmacy Med Name: ONETOUCH DELICA CAROLINE 33G MIS] 100 each 0     Sig: USE 1 LANCET TO CHECK GLUCOSE ONCE DAILY      Last office visit with prescribing clinician: 12/12/2023   Last telemedicine visit with prescribing clinician: Visit date not found   Next office visit with prescribing clinician: 6/12/2024                         Would you like a call back once the refill request has been completed: [] Yes [] No    If the office needs to give you a call back, can they leave a voicemail: [] Yes [] No    Shira Bustillo  02/29/24, 07:48 EST

## 2024-02-29 NOTE — TELEPHONE ENCOUNTER
Rx Refill Note  Requested Prescriptions     Pending Prescriptions Disp Refills    glucose blood test strip 100 each 3     Sig: Ins will only cover testing 1 x day Dx: E 11.9      Last office visit with prescribing clinician: 12/12/2023   Last telemedicine visit with prescribing clinician: Visit date not found   Next office visit with prescribing clinician: 6/12/2024                         Would you like a call back once the refill request has been completed: [] Yes [] No    If the office needs to give you a call back, can they leave a voicemail: [] Yes [] No    Shira Bustillo  02/29/24, 12:32 EST

## 2024-05-28 RX ORDER — ROSUVASTATIN CALCIUM 20 MG/1
TABLET, COATED ORAL
Qty: 30 TABLET | Refills: 0 | Status: SHIPPED | OUTPATIENT
Start: 2024-05-28

## 2024-05-28 NOTE — TELEPHONE ENCOUNTER
Rx Refill Note  Requested Prescriptions     Pending Prescriptions Disp Refills    rosuvastatin (CRESTOR) 20 MG tablet [Pharmacy Med Name: Rosuvastatin Calcium 20 MG Oral Tablet] 90 tablet 0     Sig: TAKE 1 TABLET BY MOUTH ONCE DAILY AT NIGHT      Last office visit with prescribing clinician: 12/12/2023   Last telemedicine visit with prescribing clinician: Visit date not found   Next office visit with prescribing clinician: 6/12/2024                         Would you like a call back once the refill request has been completed: [] Yes [] No    If the office needs to give you a call back, can they leave a voicemail: [] Yes [] No    Shira Bustillo  05/28/24, 08:24 EDT

## 2024-06-04 ENCOUNTER — LAB (OUTPATIENT)
Dept: ENDOCRINOLOGY | Age: 74
End: 2024-06-04
Payer: MEDICARE

## 2024-06-04 DIAGNOSIS — E11.65 TYPE 2 DIABETES MELLITUS WITH HYPERGLYCEMIA, WITH LONG-TERM CURRENT USE OF INSULIN: ICD-10-CM

## 2024-06-04 DIAGNOSIS — E78.5 HYPERLIPIDEMIA ASSOCIATED WITH TYPE 2 DIABETES MELLITUS: ICD-10-CM

## 2024-06-04 DIAGNOSIS — E11.69 HYPERLIPIDEMIA ASSOCIATED WITH TYPE 2 DIABETES MELLITUS: ICD-10-CM

## 2024-06-04 DIAGNOSIS — E11.42 DIABETIC PERIPHERAL NEUROPATHY: ICD-10-CM

## 2024-06-04 DIAGNOSIS — Z79.4 TYPE 2 DIABETES MELLITUS WITH HYPERGLYCEMIA, WITH LONG-TERM CURRENT USE OF INSULIN: ICD-10-CM

## 2024-06-05 LAB
ALBUMIN SERPL-MCNC: 4.6 G/DL (ref 3.5–5.2)
ALBUMIN/CREAT UR: 41 MG/G CREAT (ref 0–29)
ALBUMIN/GLOB SERPL: 2.4 G/DL
ALP SERPL-CCNC: 66 U/L (ref 39–117)
ALT SERPL-CCNC: 37 U/L (ref 1–33)
AST SERPL-CCNC: 35 U/L (ref 1–32)
BILIRUB SERPL-MCNC: 0.4 MG/DL (ref 0–1.2)
BUN SERPL-MCNC: 15 MG/DL (ref 8–23)
BUN/CREAT SERPL: 22.1 (ref 7–25)
CALCIUM SERPL-MCNC: 9.4 MG/DL (ref 8.6–10.5)
CHLORIDE SERPL-SCNC: 105 MMOL/L (ref 98–107)
CHOLEST SERPL-MCNC: 144 MG/DL (ref 0–200)
CO2 SERPL-SCNC: 24.3 MMOL/L (ref 22–29)
CREAT SERPL-MCNC: 0.68 MG/DL (ref 0.57–1)
CREAT UR-MCNC: 87.9 MG/DL
EGFRCR SERPLBLD CKD-EPI 2021: 92.7 ML/MIN/1.73
GLOBULIN SER CALC-MCNC: 1.9 GM/DL
GLUCOSE SERPL-MCNC: 125 MG/DL (ref 65–99)
HBA1C MFR BLD: 6.5 % (ref 4.8–5.6)
HDLC SERPL-MCNC: 43 MG/DL (ref 40–60)
IMP & REVIEW OF LAB RESULTS: NORMAL
LDLC SERPL CALC-MCNC: 63 MG/DL (ref 0–100)
MICROALBUMIN UR-MCNC: 36.3 UG/ML
POTASSIUM SERPL-SCNC: 4.3 MMOL/L (ref 3.5–5.2)
PROT SERPL-MCNC: 6.5 G/DL (ref 6–8.5)
SODIUM SERPL-SCNC: 143 MMOL/L (ref 136–145)
TRIGL SERPL-MCNC: 232 MG/DL (ref 0–150)
VLDLC SERPL CALC-MCNC: 38 MG/DL (ref 5–40)

## 2024-06-12 ENCOUNTER — OFFICE VISIT (OUTPATIENT)
Dept: ENDOCRINOLOGY | Age: 74
End: 2024-06-12
Payer: MEDICARE

## 2024-06-12 VITALS
BODY MASS INDEX: 33.93 KG/M2 | SYSTOLIC BLOOD PRESSURE: 132 MMHG | DIASTOLIC BLOOD PRESSURE: 78 MMHG | TEMPERATURE: 98.5 F | HEIGHT: 67 IN | WEIGHT: 216.2 LBS | HEART RATE: 75 BPM | OXYGEN SATURATION: 97 %

## 2024-06-12 DIAGNOSIS — E11.69 HYPERLIPIDEMIA ASSOCIATED WITH TYPE 2 DIABETES MELLITUS: ICD-10-CM

## 2024-06-12 DIAGNOSIS — E11.65 TYPE 2 DIABETES MELLITUS WITH HYPERGLYCEMIA, WITH LONG-TERM CURRENT USE OF INSULIN: Primary | ICD-10-CM

## 2024-06-12 DIAGNOSIS — E78.5 HYPERLIPIDEMIA ASSOCIATED WITH TYPE 2 DIABETES MELLITUS: ICD-10-CM

## 2024-06-12 DIAGNOSIS — E78.2 MIXED HYPERLIPIDEMIA: ICD-10-CM

## 2024-06-12 DIAGNOSIS — E11.42 DIABETIC PERIPHERAL NEUROPATHY: ICD-10-CM

## 2024-06-12 DIAGNOSIS — I25.10 CORONARY ARTERY DISEASE INVOLVING NATIVE CORONARY ARTERY OF NATIVE HEART WITHOUT ANGINA PECTORIS: ICD-10-CM

## 2024-06-12 DIAGNOSIS — Z79.4 TYPE 2 DIABETES MELLITUS WITH HYPERGLYCEMIA, WITH LONG-TERM CURRENT USE OF INSULIN: Primary | ICD-10-CM

## 2024-06-12 PROCEDURE — 99214 OFFICE O/P EST MOD 30 MIN: CPT | Performed by: NURSE PRACTITIONER

## 2024-06-12 PROCEDURE — 3044F HG A1C LEVEL LT 7.0%: CPT | Performed by: NURSE PRACTITIONER

## 2024-06-12 RX ORDER — EMPAGLIFLOZIN, METFORMIN HYDROCHLORIDE 12.5; 1 MG/1; MG/1
1 TABLET, EXTENDED RELEASE ORAL DAILY
Qty: 90 TABLET | Refills: 1 | Status: SHIPPED | OUTPATIENT
Start: 2024-06-12

## 2024-06-12 NOTE — PROGRESS NOTES
"Chief Complaint  Diabetes    Subjective        Delaney Dunlap presents to [facility] ENDOCRINOLOGY  History of Present Illness    Type 2 dm, 5-10 years    Dm regimen: Synjardy XR 12.5-1000mg QD  PMH: pancreatitis, Glaucoma and mac degen, Dm neuropathy, CAD     Checking BS BID  CV: on statin   Upcoming diabetic eye exam     Objective   Vital Signs:  /78 (BP Location: Left arm, Patient Position: Sitting)   Pulse 75   Temp 98.5 °F (36.9 °C) (Oral)   Ht 170.2 cm (67.01\")   Wt 98.1 kg (216 lb 3.2 oz)   SpO2 97%   BMI 33.85 kg/m²   Estimated body mass index is 33.85 kg/m² as calculated from the following:    Height as of this encounter: 170.2 cm (67.01\").    Weight as of this encounter: 98.1 kg (216 lb 3.2 oz).             Physical Exam  Vitals reviewed.   Constitutional:       General: She is not in acute distress.  HENT:      Head: Normocephalic and atraumatic.   Cardiovascular:      Rate and Rhythm: Normal rate.   Pulmonary:      Effort: Pulmonary effort is normal. No respiratory distress.   Musculoskeletal:         General: No signs of injury. Normal range of motion.      Cervical back: Normal range of motion and neck supple.   Skin:     General: Skin is warm and dry.   Neurological:      Mental Status: She is alert and oriented to person, place, and time. Mental status is at baseline.   Psychiatric:         Mood and Affect: Mood normal.         Behavior: Behavior normal.         Thought Content: Thought content normal.         Judgment: Judgment normal.        Result Review :    The following data was reviewed by: MEHUL Francis on 06/12/2024:  Common labs          11/28/2023    09:52 6/4/2024    11:07   Common Labs   Glucose 122  125    BUN 11  15    Creatinine 0.70  0.68    Sodium 138  143    Potassium 4.1  4.3    Chloride 101  105    Calcium 9.8  9.4    Total Protein  6.5    Albumin  4.6    Total Bilirubin  0.4    Alkaline Phosphatase  66    AST (SGOT)  35    ALT (SGPT)  37  "   Total Cholesterol  144    Triglycerides  232    HDL Cholesterol  43    LDL Cholesterol   63    Hemoglobin A1C 6.00  6.50    Microalbumin, Urine  36.3                   Assessment and Plan     Diagnoses and all orders for this visit:    1. Type 2 diabetes mellitus with hyperglycemia, with long-term current use of insulin (Primary)    2. Hyperlipidemia associated with type 2 diabetes mellitus    3. Diabetic peripheral neuropathy    4. Coronary artery disease involving native coronary artery of native heart without angina pectoris    5. Mixed hyperlipidemia    Other orders  -     Synjardy XR 12.5-1000 MG tablet sustained-release 24 hour; Take 1 tablet by mouth Daily.  Dispense: 90 tablet; Refill: 1             Follow Up     Return in about 6 months (around 12/12/2024).    A1c continues to trend up  Continue with lifestyle modifications  May need to change synjardy to BID  Continue statin, LDL at goal      Patient was given instructions and counseling regarding her condition or for health maintenance advice. Please see specific information pulled into the AVS if appropriate.     Brenda Louie APRN

## 2024-06-24 NOTE — TELEPHONE ENCOUNTER
Rx Refill Note  Requested Prescriptions     Pending Prescriptions Disp Refills    rosuvastatin (CRESTOR) 20 MG tablet [Pharmacy Med Name: Rosuvastatin Calcium 20 MG Oral Tablet] 30 tablet 0     Sig: TAKE 1 TABLET BY MOUTH ONCE DAILY AT NIGHT      Last office visit with prescribing clinician: 6/12/2024   Last telemedicine visit with prescribing clinician: Visit date not found   Next office visit with prescribing clinician: 12/12/2024                         Would you like a call back once the refill request has been completed: [] Yes [] No    If the office needs to give you a call back, can they leave a voicemail: [] Yes [] No    Donya Quick MA  06/24/24, 08:19 EDT

## 2024-06-25 RX ORDER — ROSUVASTATIN CALCIUM 20 MG/1
TABLET, COATED ORAL
Qty: 90 TABLET | Refills: 0 | Status: SHIPPED | OUTPATIENT
Start: 2024-06-25

## 2024-09-18 RX ORDER — ROSUVASTATIN CALCIUM 20 MG/1
TABLET, COATED ORAL
Qty: 90 TABLET | Refills: 0 | Status: SHIPPED | OUTPATIENT
Start: 2024-09-18

## 2024-11-05 RX ORDER — DIGOXIN 250 MCG
250 TABLET ORAL DAILY
Qty: 30 TABLET | Refills: 0 | Status: SHIPPED | OUTPATIENT
Start: 2024-11-05

## 2024-11-07 ENCOUNTER — OFFICE VISIT (OUTPATIENT)
Dept: CARDIOLOGY | Facility: CLINIC | Age: 74
End: 2024-11-07
Payer: MEDICARE

## 2024-11-07 VITALS
DIASTOLIC BLOOD PRESSURE: 69 MMHG | SYSTOLIC BLOOD PRESSURE: 160 MMHG | WEIGHT: 206 LBS | HEIGHT: 67 IN | BODY MASS INDEX: 32.33 KG/M2 | HEART RATE: 63 BPM

## 2024-11-07 DIAGNOSIS — I25.10 CORONARY ARTERY DISEASE INVOLVING NATIVE CORONARY ARTERY OF NATIVE HEART WITHOUT ANGINA PECTORIS: ICD-10-CM

## 2024-11-07 DIAGNOSIS — R01.1 MURMUR: Primary | ICD-10-CM

## 2024-11-07 DIAGNOSIS — R03.0 BORDERLINE SYSTOLIC HTN: ICD-10-CM

## 2024-11-07 PROCEDURE — 93000 ELECTROCARDIOGRAM COMPLETE: CPT | Performed by: INTERNAL MEDICINE

## 2024-11-07 PROCEDURE — 99213 OFFICE O/P EST LOW 20 MIN: CPT | Performed by: INTERNAL MEDICINE

## 2024-11-07 NOTE — PROGRESS NOTES
1 YR FOLLOW UP   Subjective:        Delaney Dunlap is a 74 y.o. female who here for follow up    CC  Heart murmur  Cp with cough  HPI  74-year-old female complaining of chest pain and cough     Problems Addressed this Visit          Cardiac and Vasculature    Murmur - Primary    Relevant Orders    Adult Transthoracic Echo Complete W/ Cont if Necessary Per Protocol    Treadmill Stress Test    Borderline systolic HTN    Relevant Orders    Adult Transthoracic Echo Complete W/ Cont if Necessary Per Protocol    Treadmill Stress Test    Coronary artery disease involving native coronary artery of native heart without angina pectoris    Relevant Orders    Adult Transthoracic Echo Complete W/ Cont if Necessary Per Protocol    Treadmill Stress Test     Diagnoses         Codes Comments    Murmur    -  Primary ICD-10-CM: R01.1  ICD-9-CM: 785.2     Borderline systolic HTN     ICD-10-CM: R03.0  ICD-9-CM: 796.2     Coronary artery disease involving native coronary artery of native heart without angina pectoris     ICD-10-CM: I25.10  ICD-9-CM: 414.01           .    The following portions of the patient's history were reviewed and updated as appropriate: allergies, current medications, past family history, past medical history, past social history, past surgical history and problem list.    Past Medical History:   Diagnosis Date    Diabetes mellitus     Glaucoma     Hyperlipidemia     Pancreatitis     SVT (supraventricular tachycardia)     Type 2 diabetes mellitus      reports that she has never smoked. She has never been exposed to tobacco smoke. She has never used smokeless tobacco. She reports that she does not currently use alcohol. She reports that she does not use drugs.   Family History   Problem Relation Age of Onset    Heart attack Mother     Glaucoma Mother     Diabetes Mother     Obesity Mother     Arrhythmia Father     Angina Father     Glaucoma Sister     Breast cancer Sister     Cancer Sister     Diabetes Sister      "Hypertension Sister     Obesity Sister     Colon cancer Brother     Cancer Brother     Diabetes Brother     Hypertension Brother     Obesity Brother     Obesity Brother         half-brother    Obesity Maternal Grandmother     Cancer Paternal Grandmother        Review of Systems  Constitutional: No wt loss, fever, fatigue  Gastrointestinal: No nausea, abdominal pain  Behavioral/Psych: No insomnia or anxiety   Cardiovascular chest pain  Objective:       Physical Exam           Physical Exam  /69   Pulse 63   Ht 170.2 cm (67\")   Wt 93.4 kg (206 lb)   BMI 32.26 kg/m²     General appearance: No acute changes   Eyes: Sclerae conjunctivae normal, pupils reactive   HENT: Atraumatic; oropharynx clear with moist mucous membranes and no mucosal ulcerations;  Neck: Trachea midline; NECK, supple, no thyromegaly or lymphadenopathy   Lungs: Normal size and shape, normal breath sounds, equal distribution of air, no rales and rhonchi   CV: S1-S2 regular, no murmurs, no rub, no gallop   Abdomen: Soft, nontender; no masses , no abnormal abdominal sounds   Extremities: No deformity , normal color , no peripheral edema   Skin: Normal temperature, turgor and texture; no rash, ulcers  Psych: Appropriate affect, alert and oriented to person, place and time             ECG 12 Lead    Date/Time: 11/7/2024 11:35 AM  Performed by: Karthikeyan Alvarado MD    Authorized by: Karthikeyan Alvarado MD  Comparison: compared with previous ECG   Similar to previous ECG  Rhythm: sinus rhythm    Clinical impression: non-specific ECG            Echocardiogram:    Results for orders placed during the hospital encounter of 10/24/22    Adult Transthoracic Echo Complete W/ Cont if Necessary Per Protocol    Interpretation Summary    Left ventricular ejection fraction appears to be 56 - 60%.    There is calcification of the aortic valve.          Current Outpatient Medications:     digoxin (LANOXIN) 250 MCG tablet, Take 1 tablet by mouth once " daily, Disp: 30 tablet, Rfl: 0    latanoprost (XALATAN) 0.005 % ophthalmic solution, INT 1 GTT IN OU Q NIGHT, Disp: , Rfl:     Multiple Vitamins-Minerals (MULTIVITAMIN WOMEN 50+) tablet, Take 1 tablet by mouth Daily., Disp: , Rfl:     multivitamins-minerals (PRESERVISION AREDS 2) capsule capsule, Take 1 capsule by mouth 2 (Two) Times a Day., Disp: , Rfl:     Omega-3 1000 MG capsule, Take 1 capsule by mouth 2 (Two) Times a Day., Disp: , Rfl:     rosuvastatin (CRESTOR) 20 MG tablet, TAKE 1 TABLET BY MOUTH ONCE DAILY AT NIGHT, Disp: 90 tablet, Rfl: 0    Synjardy XR 12.5-1000 MG tablet sustained-release 24 hour, Take 1 tablet by mouth Daily., Disp: 90 tablet, Rfl: 1    glucose blood test strip, Ins will only cover testing 1 x day Dx: E 11.9, Disp: 100 each, Rfl: 3    glucose monitor monitoring kit, Dispense one kit based on insurance preference and related supplies to check 2 times a day. Dx: E 11.9, Disp: 1 each, Rfl: 0    Lancets (OneTouch Delica Plus [id number]) misc, USE 1 LANCET TO CHECK GLUCOSE ONCE DAILY, Disp: 100 each, Rfl: 12   Assessment:                Plan:          ICD-10-CM ICD-9-CM   1. Murmur  R01.1 785.2   2. Borderline systolic HTN  R03.0 796.2   3. Coronary artery disease involving native coronary artery of native heart without angina pectoris  I25.10 414.01     1. Murmur  Considering patient's medical condition as well as the risk factors, patient will require echocardiogram for further evaluation for the LV function, four-chamber evaluation, including the pressures, valvular function and  pericardial disease and pericardial effusion    - Adult Transthoracic Echo Complete W/ Cont if Necessary Per Protocol  - Treadmill Stress Test    2. Borderline systolic HTN  Continue current treatment  - Adult Transthoracic Echo Complete W/ Cont if Necessary Per Protocol  - Treadmill Stress Test    3. Coronary artery disease involving native coronary artery of native heart without angina pectoris  No angina  pectoris  - Adult Transthoracic Echo Complete W/ Cont if Necessary Per Protocol  - Treadmill Stress Test      Ett, echo    Follow up  COUNSELING:    Delaney Peña was given to patient for the following topics: diagnostic results, risk factor reductions, impressions, risks and benefits of treatment options and importance of treatment compliance .       SMOKING COUNSELING:        Dictated using Dragon dictation

## 2024-11-25 ENCOUNTER — HOSPITAL ENCOUNTER (OUTPATIENT)
Dept: CARDIOLOGY | Facility: HOSPITAL | Age: 74
Discharge: HOME | End: 2024-11-25
Payer: MEDICARE

## 2024-11-25 VITALS
WEIGHT: 207 LBS | OXYGEN SATURATION: 96 % | DIASTOLIC BLOOD PRESSURE: 63 MMHG | HEIGHT: 67 IN | SYSTOLIC BLOOD PRESSURE: 142 MMHG | BODY MASS INDEX: 32.49 KG/M2 | HEART RATE: 91 BPM

## 2024-11-25 VITALS
HEIGHT: 67 IN | BODY MASS INDEX: 32.49 KG/M2 | DIASTOLIC BLOOD PRESSURE: 63 MMHG | WEIGHT: 207.01 LBS | SYSTOLIC BLOOD PRESSURE: 142 MMHG

## 2024-11-25 DIAGNOSIS — I25.10 CORONARY ARTERY DISEASE INVOLVING NATIVE CORONARY ARTERY OF NATIVE HEART WITHOUT ANGINA PECTORIS: Primary | ICD-10-CM

## 2024-11-25 DIAGNOSIS — E11.9 TYPE 2 DIABETES MELLITUS WITHOUT COMPLICATION, WITHOUT LONG-TERM CURRENT USE OF INSULIN: ICD-10-CM

## 2024-11-25 DIAGNOSIS — R94.31 ABNORMAL EKG: ICD-10-CM

## 2024-11-25 DIAGNOSIS — R94.39 ABNORMAL STRESS ECG WITH TREADMILL: ICD-10-CM

## 2024-11-25 PROCEDURE — 93306 TTE W/DOPPLER COMPLETE: CPT

## 2024-11-25 PROCEDURE — 93017 CV STRESS TEST TRACING ONLY: CPT

## 2024-11-26 LAB
AORTIC DIMENSIONLESS INDEX: 0.4 (DI)
BH CV ECHO MEAS - ACS: 1.48 CM
BH CV ECHO MEAS - AO MAX PG: 18.6 MMHG
BH CV ECHO MEAS - AO MEAN PG: 10.5 MMHG
BH CV ECHO MEAS - AO V2 MAX: 215.5 CM/SEC
BH CV ECHO MEAS - AO V2 VTI: 44.7 CM
BH CV ECHO MEAS - AVA(I,D): 1.52 CM2
BH CV ECHO MEAS - EDV(CUBED): 88.8 ML
BH CV ECHO MEAS - EDV(MOD-SP2): 67 ML
BH CV ECHO MEAS - EDV(MOD-SP4): 85 ML
BH CV ECHO MEAS - EF(MOD-BP): 65.1 %
BH CV ECHO MEAS - EF(MOD-SP2): 64.2 %
BH CV ECHO MEAS - EF(MOD-SP4): 62.4 %
BH CV ECHO MEAS - ESV(CUBED): 23.3 ML
BH CV ECHO MEAS - ESV(MOD-SP2): 24 ML
BH CV ECHO MEAS - ESV(MOD-SP4): 32 ML
BH CV ECHO MEAS - FS: 36 %
BH CV ECHO MEAS - IVS/LVPW: 1.24 CM
BH CV ECHO MEAS - IVSD: 1 CM
BH CV ECHO MEAS - LA A2CS (ATRIAL LENGTH): 4.8 CM
BH CV ECHO MEAS - LA A4C LENGTH: 5.1 CM
BH CV ECHO MEAS - LAT PEAK E' VEL: 8.7 CM/SEC
BH CV ECHO MEAS - LV MASS(C)D: 131.5 GRAMS
BH CV ECHO MEAS - LV MAX PG: 3.2 MMHG
BH CV ECHO MEAS - LV MEAN PG: 1.76 MMHG
BH CV ECHO MEAS - LV V1 MAX: 90 CM/SEC
BH CV ECHO MEAS - LV V1 VTI: 19.4 CM
BH CV ECHO MEAS - LVIDD: 4.5 CM
BH CV ECHO MEAS - LVIDS: 2.9 CM
BH CV ECHO MEAS - LVOT AREA: 3.5 CM2
BH CV ECHO MEAS - LVOT DIAM: 2.11 CM
BH CV ECHO MEAS - LVPWD: 0.81 CM
BH CV ECHO MEAS - MED PEAK E' VEL: 6.6 CM/SEC
BH CV ECHO MEAS - MV A DUR: 0.13 SEC
BH CV ECHO MEAS - MV A MAX VEL: 90.4 CM/SEC
BH CV ECHO MEAS - MV DEC SLOPE: 494.3 CM/SEC2
BH CV ECHO MEAS - MV DEC TIME: 0.23 SEC
BH CV ECHO MEAS - MV E MAX VEL: 63.8 CM/SEC
BH CV ECHO MEAS - MV E/A: 0.71
BH CV ECHO MEAS - MV MAX PG: 4.1 MMHG
BH CV ECHO MEAS - MV MEAN PG: 2.03 MMHG
BH CV ECHO MEAS - MV P1/2T: 60.9 MSEC
BH CV ECHO MEAS - MV V2 VTI: 23.7 CM
BH CV ECHO MEAS - MVA(P1/2T): 3.6 CM2
BH CV ECHO MEAS - MVA(VTI): 2.9 CM2
BH CV ECHO MEAS - PA ACC TIME: 0.1 SEC
BH CV ECHO MEAS - PA V2 MAX: 120.2 CM/SEC
BH CV ECHO MEAS - PULM A REVS DUR: 0.07 SEC
BH CV ECHO MEAS - PULM A REVS VEL: 20.1 CM/SEC
BH CV ECHO MEAS - PULM DIAS VEL: 42.4 CM/SEC
BH CV ECHO MEAS - PULM S/D: 1.21
BH CV ECHO MEAS - PULM SYS VEL: 51.4 CM/SEC
BH CV ECHO MEAS - RV MAX PG: 3.1 MMHG
BH CV ECHO MEAS - RV V1 MAX: 88.7 CM/SEC
BH CV ECHO MEAS - RV V1 VTI: 15.4 CM
BH CV ECHO MEAS - SV(LVOT): 68 ML
BH CV ECHO MEAS - SV(MOD-SP2): 43 ML
BH CV ECHO MEAS - SV(MOD-SP4): 53 ML
BH CV ECHO MEAS - TAPSE (>1.6): 2.03 CM
BH CV ECHO MEASUREMENTS AVERAGE E/E' RATIO: 8.34
BH CV XLRA - RV BASE: 3.4 CM
BH CV XLRA - RV LENGTH: 6 CM
BH CV XLRA - RV MID: 2.6 CM
BH CV XLRA - TDI S': 17.3 CM/SEC
LEFT ATRIUM VOLUME INDEX: 27.9 ML/M2
SINUS: 2.9 CM
STJ: 2.23 CM

## 2024-11-27 ENCOUNTER — HOSPITAL ENCOUNTER (OUTPATIENT)
Dept: CARDIOLOGY | Facility: HOSPITAL | Age: 74
Discharge: HOME | End: 2024-11-27
Payer: MEDICARE

## 2024-11-27 DIAGNOSIS — R94.39 ABNORMAL STRESS ECG WITH TREADMILL: ICD-10-CM

## 2024-11-27 DIAGNOSIS — E11.9 TYPE 2 DIABETES MELLITUS WITHOUT COMPLICATION, WITHOUT LONG-TERM CURRENT USE OF INSULIN: ICD-10-CM

## 2024-11-27 DIAGNOSIS — R94.31 ABNORMAL EKG: ICD-10-CM

## 2024-11-27 DIAGNOSIS — I25.10 CORONARY ARTERY DISEASE INVOLVING NATIVE CORONARY ARTERY OF NATIVE HEART WITHOUT ANGINA PECTORIS: ICD-10-CM

## 2024-11-27 LAB
BH CV REST NUCLEAR ISOTOPE DOSE: 10.9 MCI
BH CV STRESS BP STAGE 1: NORMAL
BH CV STRESS BP STAGE 1: NORMAL
BH CV STRESS BP STAGE 2: NORMAL
BH CV STRESS BP STAGE 2: NORMAL
BH CV STRESS BP STAGE 3: NORMAL
BH CV STRESS BP STAGE 3: NORMAL
BH CV STRESS DURATION MIN STAGE 1: 2
BH CV STRESS DURATION MIN STAGE 1: 3
BH CV STRESS DURATION MIN STAGE 2: 2
BH CV STRESS DURATION MIN STAGE 2: 5
BH CV STRESS DURATION MIN STAGE 3: 3
BH CV STRESS DURATION MIN STAGE 3: 3
BH CV STRESS DURATION SEC STAGE 1: 0
BH CV STRESS DURATION SEC STAGE 1: 0
BH CV STRESS DURATION SEC STAGE 2: 0
BH CV STRESS DURATION SEC STAGE 2: 10
BH CV STRESS DURATION SEC STAGE 3: 0
BH CV STRESS DURATION SEC STAGE 3: 0
BH CV STRESS GRADE STAGE 1: 0
BH CV STRESS GRADE STAGE 1: 5
BH CV STRESS GRADE STAGE 2: 10
BH CV STRESS GRADE STAGE 2: 10
BH CV STRESS GRADE STAGE 3: 10
BH CV STRESS GRADE STAGE 3: 12
BH CV STRESS HR STAGE 1: 102
BH CV STRESS HR STAGE 1: 96
BH CV STRESS HR STAGE 2: 118
BH CV STRESS HR STAGE 2: 118
BH CV STRESS HR STAGE 3: 132
BH CV STRESS HR STAGE 3: 143
BH CV STRESS METS STAGE 1: 1.3
BH CV STRESS METS STAGE 1: 2.3
BH CV STRESS METS STAGE 2: 4.6
BH CV STRESS METS STAGE 2: 5.1
BH CV STRESS METS STAGE 3: 6.2
BH CV STRESS METS STAGE 3: 7.1
BH CV STRESS NUCLEAR ISOTOPE DOSE: 32.9 MCI
BH CV STRESS PROTOCOL 1: NORMAL
BH CV STRESS PROTOCOL 1: NORMAL
BH CV STRESS RECOVERY BP: NORMAL MMHG
BH CV STRESS RECOVERY BP: NORMAL MMHG
BH CV STRESS RECOVERY HR: 101 BPM
BH CV STRESS RECOVERY HR: 92 BPM
BH CV STRESS SPEED STAGE 1: 1.7
BH CV STRESS SPEED STAGE 1: 1.7
BH CV STRESS SPEED STAGE 2: 1.7
BH CV STRESS SPEED STAGE 2: 2.4
BH CV STRESS SPEED STAGE 3: 1.7
BH CV STRESS SPEED STAGE 3: 2.5
BH CV STRESS STAGE 1: 0.5
BH CV STRESS STAGE 1: 1
BH CV STRESS STAGE 2: 1
BH CV STRESS STAGE 2: 2
BH CV STRESS STAGE 3: 2
BH CV STRESS STAGE 3: 3
LV EF NUC BP: 70 %
MAXIMAL PREDICTED HEART RATE: 147 BPM
MAXIMAL PREDICTED HEART RATE: 147 BPM
PERCENT MAX PREDICTED HR: 90.48 %
PERCENT MAX PREDICTED HR: 97.28 %
STRESS BASELINE BP: NORMAL MMHG
STRESS BASELINE BP: NORMAL MMHG
STRESS BASELINE HR: 76 BPM
STRESS BASELINE HR: 91 BPM
STRESS O2 SAT REST: 96 %
STRESS O2 SAT REST: 97 %
STRESS PERCENT HR: 106 %
STRESS PERCENT HR: 114 %
STRESS POST ESTIMATED WORKLOAD: 6.2 METS
STRESS POST ESTIMATED WORKLOAD: 7.1 METS
STRESS POST EXERCISE DUR MIN: 7 MIN
STRESS POST EXERCISE DUR MIN: 8 MIN
STRESS POST EXERCISE DUR SEC: 0 SEC
STRESS POST EXERCISE DUR SEC: 10 SEC
STRESS POST PEAK BP: NORMAL MMHG
STRESS POST PEAK BP: NORMAL MMHG
STRESS POST PEAK HR: 133 BPM
STRESS POST PEAK HR: 143 BPM
STRESS TARGET HR: 125 BPM
STRESS TARGET HR: 125 BPM

## 2024-11-27 PROCEDURE — 78452 HT MUSCLE IMAGE SPECT MULT: CPT

## 2024-11-27 PROCEDURE — A9500 TC99M SESTAMIBI: HCPCS | Performed by: INTERNAL MEDICINE

## 2024-11-27 PROCEDURE — 93017 CV STRESS TEST TRACING ONLY: CPT

## 2024-11-27 PROCEDURE — 34310000005 TECHNETIUM SESTAMIBI: Performed by: INTERNAL MEDICINE

## 2024-11-27 RX ADMIN — TECHNETIUM TC 99M SESTAMIBI 1 DOSE: 1 INJECTION INTRAVENOUS at 10:21

## 2024-11-27 RX ADMIN — TECHNETIUM TC 99M SESTAMIBI 1 DOSE: 1 INJECTION INTRAVENOUS at 07:51

## 2024-12-02 RX ORDER — DIGOXIN 250 MCG
250 TABLET ORAL DAILY
Qty: 30 TABLET | Refills: 5 | Status: SHIPPED | OUTPATIENT
Start: 2024-12-02

## 2024-12-04 NOTE — TELEPHONE ENCOUNTER
Rx Refill Note  Requested Prescriptions     Pending Prescriptions Disp Refills    glucose blood test strip 100 each 3     Sig: Ins will only cover testing 1 x day Dx: E 11.9      Last office visit with prescribing clinician: 6/12/2024   Last telemedicine visit with prescribing clinician: Visit date not found   Next office visit with prescribing clinician: 12/12/2024                         Would you like a call back once the refill request has been completed: [] Yes [] No    If the office needs to give you a call back, can they leave a voicemail: [] Yes [] No    Catalina Nguyễn MA  12/04/24, 12:37 EST

## 2024-12-06 ENCOUNTER — OFFICE VISIT (OUTPATIENT)
Dept: CARDIOLOGY | Facility: CLINIC | Age: 74
End: 2024-12-06
Payer: MEDICARE

## 2024-12-06 VITALS
DIASTOLIC BLOOD PRESSURE: 79 MMHG | HEART RATE: 77 BPM | SYSTOLIC BLOOD PRESSURE: 140 MMHG | HEIGHT: 67 IN | WEIGHT: 210 LBS | BODY MASS INDEX: 32.96 KG/M2

## 2024-12-06 DIAGNOSIS — I35.0 MODERATE AORTIC VALVE STENOSIS: Primary | ICD-10-CM

## 2024-12-06 DIAGNOSIS — E78.5 HYPERLIPIDEMIA, UNSPECIFIED HYPERLIPIDEMIA TYPE: ICD-10-CM

## 2024-12-06 DIAGNOSIS — I47.10 SUPRAVENTRICULAR TACHYCARDIA: ICD-10-CM

## 2024-12-06 DIAGNOSIS — R03.0 BORDERLINE SYSTOLIC HTN: ICD-10-CM

## 2024-12-06 PROCEDURE — 99214 OFFICE O/P EST MOD 30 MIN: CPT

## 2024-12-06 NOTE — PROGRESS NOTES
Subjective:        Delaney Dunlap is a 74 y.o. female who here for follow up    Chief Complaint   Patient presents with    Follow-up     TEST RESULTS       HPI    This is a 74-year-old female who is known with this provider with hyperlipidemia, paroxysmal SVT, DM type II, seen in office today in follow-up for testing.    Echo 11/25/2024 EF 61 to 65%, normal LV function.  Moderate AV stenosis LEOBARDO 1.5, mean pressure gradient 10, max pressure gradient 18.  Stress test 11/27/2024 was a normal myocardial perfusion study no evidence of ischemia.    The following portions of the patient's history were reviewed and updated as appropriate: allergies, current medications, past family history, past medical history, past social history, past surgical history and problem list.    Past Medical History:   Diagnosis Date    Diabetes mellitus     Glaucoma     Hyperlipidemia     Pancreatitis     SVT (supraventricular tachycardia)     Type 2 diabetes mellitus          reports that she has never smoked. She has never been exposed to tobacco smoke. She has never used smokeless tobacco. She reports that she does not currently use alcohol. She reports that she does not use drugs.     Family History   Problem Relation Age of Onset    Heart disease Mother     Heart attack Mother     Glaucoma Mother     Diabetes Mother     Obesity Mother     Heart disease Father     Arrhythmia Father     Angina Father     Glaucoma Sister     Breast cancer Sister     Cancer Sister     Diabetes Sister     Hypertension Sister     Obesity Sister     Colon cancer Brother     Cancer Brother     Diabetes Brother     Hypertension Brother     Obesity Brother     Leukemia Brother     Obesity Brother         half-brother    Obesity Maternal Grandmother     Cancer Paternal Grandmother        ROS     Review of Systems  Constitutional: No wt loss, fever, fatigue  Gastrointestinal: No nausea, abdominal pain  Behavioral/Psych: No insomnia or anxiety  Cardiovascular no  chest pain or shortness of breath      Objective:           Vitals and nursing note reviewed.   Constitutional:       Appearance: Well-developed.   HENT:      Head: Normocephalic.      Right Ear: External ear normal.      Left Ear: External ear normal.   Neck:      Vascular: No JVD.   Pulmonary:      Effort: Pulmonary effort is normal. No respiratory distress.      Breath sounds: Normal breath sounds. No stridor. No rales.   Cardiovascular:      Normal rate. Regular rhythm.      No gallop.    Pulses:     Intact distal pulses.   Edema:     Peripheral edema absent.   Abdominal:      General: Bowel sounds are normal. There is no distension.      Palpations: Abdomen is soft.      Tenderness: There is no abdominal tenderness. There is no guarding.   Musculoskeletal: Normal range of motion.         General: No tenderness.      Cervical back: Normal range of motion. Skin:     General: Skin is warm.   Neurological:      Mental Status: Alert and oriented to person, place, and time.      Deep Tendon Reflexes: Reflexes are normal and symmetric.   Psychiatric:         Judgment: Judgment normal.         Procedures    Interpretation Summary         Myocardial perfusion imaging indicates a normal myocardial perfusion study with no evidence of ischemia. Impressions are consistent with a low risk study.    Left ventricular ejection fraction is normal (Calculated EF = 70%).Interpretation Summary         Left ventricular ejection fraction appears to be 61 - 65%.    Left ventricular diastolic function was normal.    Moderate aortic valve stenosis is present.      Current Outpatient Medications:     digoxin (LANOXIN) 250 MCG tablet, Take 1 tablet by mouth once daily, Disp: 30 tablet, Rfl: 5    glucose blood test strip, Ins will only cover testing 1 x day Dx: E 11.9, Disp: 100 each, Rfl: 3    glucose monitor monitoring kit, Dispense one kit based on insurance preference and related supplies to check 2 times a day. Dx: E 11.9, Disp: 1  each, Rfl: 0    Lancets (OneTouch Delica Plus [id number]) misc, USE 1 LANCET TO CHECK GLUCOSE ONCE DAILY, Disp: 100 each, Rfl: 12    latanoprost (XALATAN) 0.005 % ophthalmic solution, INT 1 GTT IN OU Q NIGHT, Disp: , Rfl:     Multiple Vitamins-Minerals (MULTIVITAMIN WOMEN 50+) tablet, Take 1 tablet by mouth Daily., Disp: , Rfl:     multivitamins-minerals (PRESERVISION AREDS 2) capsule capsule, Take 1 capsule by mouth 2 (Two) Times a Day., Disp: , Rfl:     Omega-3 1000 MG capsule, Take 1 capsule by mouth 2 (Two) Times a Day., Disp: , Rfl:     rosuvastatin (CRESTOR) 20 MG tablet, TAKE 1 TABLET BY MOUTH ONCE DAILY AT NIGHT, Disp: 90 tablet, Rfl: 0    Synjardy XR 12.5-1000 MG tablet sustained-release 24 hour, Take 1 tablet by mouth Daily., Disp: 90 tablet, Rfl: 1     Assessment:        Patient Active Problem List   Diagnosis    Hyperlipidemia    Premature atrial contraction    Supraventricular tachycardia    Dyslipidemia    Murmur    Borderline systolic HTN    Chronic fatigue    Type 2 diabetes mellitus without complication, without long-term current use of insulin    Vitamin D deficiency    Left-sided carotid artery disease    Aortic valve disease    Abnormal weight gain    Diabetic peripheral neuropathy    Coronary artery disease involving native coronary artery of native heart without angina pectoris               Plan:   1.  PSVT: Controlled on digoxin, continue.    2.  Hypertension: slightly elevated today, continue current management, keep bp log    Importance of controlling hypertension and blood pressure  checkup on the regular basis has been explained. Hypertension as a silent killer has been discussed. Risk reduction of the weight and regular exercises to control the hypertension has been explained.    3.  Moderate AV stenosis: asymptomatic, echo in 1 year    4.  Hyperlipidemia: Lipid panel 6/4/2024 , HDL 43, LDL 63, triglycerides 232, AST 35/ALT 37.  She reports her PCP manages her cholesterol  labs.    Risk of the hyperlipidemia, importance of the treatment has been explained. Pros and cons of the statins has been explained. Regular blood workup as well as side effects including the liver failure, myelopathy death has been explained.               No diagnosis found.    There are no diagnoses linked to this encounter.    COUNSELING: Shaq Kumarieling was given to patient for the following topics: diagnostic results, risk factor reductions, impressions, risks and benefits of treatment options and importance of treatment compliance .       SMOKING COUNSELING: Denies    1 year with echo    Sincerely,   MEHUL Smart  [facility]  12/06/24  08:40 EST    EMR Dragon/Transcription disclaimer:   Much of this encounter note is an electronic transcription/translation of spoken language to printed text. The electronic translation of spoken language may permit erroneous, or at times, nonsensical words or phrases to be inadvertently transcribed; Although I have reviewed the note for such errors, some may still exist.

## 2024-12-12 ENCOUNTER — OFFICE VISIT (OUTPATIENT)
Dept: ENDOCRINOLOGY | Age: 74
End: 2024-12-12
Payer: MEDICARE

## 2024-12-12 VITALS
TEMPERATURE: 98.7 F | WEIGHT: 207.8 LBS | OXYGEN SATURATION: 98 % | BODY MASS INDEX: 32.62 KG/M2 | HEIGHT: 67 IN | SYSTOLIC BLOOD PRESSURE: 134 MMHG | DIASTOLIC BLOOD PRESSURE: 76 MMHG | HEART RATE: 73 BPM

## 2024-12-12 DIAGNOSIS — E11.65 TYPE 2 DIABETES MELLITUS WITH HYPERGLYCEMIA, WITH LONG-TERM CURRENT USE OF INSULIN: Primary | ICD-10-CM

## 2024-12-12 DIAGNOSIS — I25.10 CORONARY ARTERY DISEASE INVOLVING NATIVE CORONARY ARTERY OF NATIVE HEART WITHOUT ANGINA PECTORIS: ICD-10-CM

## 2024-12-12 DIAGNOSIS — E11.69 HYPERLIPIDEMIA ASSOCIATED WITH TYPE 2 DIABETES MELLITUS: ICD-10-CM

## 2024-12-12 DIAGNOSIS — Z79.4 TYPE 2 DIABETES MELLITUS WITH HYPERGLYCEMIA, WITH LONG-TERM CURRENT USE OF INSULIN: Primary | ICD-10-CM

## 2024-12-12 DIAGNOSIS — E11.42 DIABETIC PERIPHERAL NEUROPATHY: ICD-10-CM

## 2024-12-12 DIAGNOSIS — E78.5 HYPERLIPIDEMIA ASSOCIATED WITH TYPE 2 DIABETES MELLITUS: ICD-10-CM

## 2024-12-12 PROCEDURE — 99214 OFFICE O/P EST MOD 30 MIN: CPT | Performed by: NURSE PRACTITIONER

## 2024-12-12 PROCEDURE — 3044F HG A1C LEVEL LT 7.0%: CPT | Performed by: NURSE PRACTITIONER

## 2024-12-12 RX ORDER — EMPAGLIFLOZIN, METFORMIN HYDROCHLORIDE 12.5; 1 MG/1; MG/1
1 TABLET, EXTENDED RELEASE ORAL DAILY
Qty: 90 TABLET | Refills: 1 | Status: SHIPPED | OUTPATIENT
Start: 2024-12-12

## 2024-12-12 RX ORDER — ROSUVASTATIN CALCIUM 20 MG/1
20 TABLET, COATED ORAL NIGHTLY
Qty: 90 TABLET | Refills: 1 | Status: SHIPPED | OUTPATIENT
Start: 2024-12-12

## 2024-12-12 NOTE — PROGRESS NOTES
"Chief Complaint  Diabetes    Subjective        Delaney Dunlap presents to [facility] ENDOCRINOLOGY  History of Present Illness    Type 2 dm ~10 years    Dm regimen: Synjardy XR 12.5-1000mg QD  PMH: pancreatitis, Glaucoma and mac degen, neuropathy, CAD- had stress test and echo last week      Checking BS BID: 90s-100s random, fasting 140s  CV: rosuvastatin 20mg QD  diabetic eye exam: UTD 2024     Objective   Vital Signs:  /76   Pulse 73   Temp 98.7 °F (37.1 °C) (Oral)   Ht 170.2 cm (67.01\")   Wt 94.3 kg (207 lb 12.8 oz)   SpO2 98%   BMI 32.54 kg/m²   Estimated body mass index is 32.54 kg/m² as calculated from the following:    Height as of this encounter: 170.2 cm (67.01\").    Weight as of this encounter: 94.3 kg (207 lb 12.8 oz).          Physical Exam  Vitals reviewed.   Constitutional:       General: She is not in acute distress.  HENT:      Head: Normocephalic and atraumatic.   Cardiovascular:      Rate and Rhythm: Normal rate.   Pulmonary:      Effort: Pulmonary effort is normal. No respiratory distress.   Musculoskeletal:         General: No signs of injury. Normal range of motion.      Cervical back: Normal range of motion and neck supple.   Skin:     General: Skin is warm and dry.   Neurological:      Mental Status: She is alert and oriented to person, place, and time. Mental status is at baseline.   Psychiatric:         Mood and Affect: Mood normal.         Behavior: Behavior normal.         Thought Content: Thought content normal.         Judgment: Judgment normal.        Result Review :  The following data was reviewed by: MEHUL Francis on 12/12/2024:  Common labs          6/4/2024    11:07   Common Labs   Glucose 125    BUN 15    Creatinine 0.68    Sodium 143    Potassium 4.3    Chloride 105    Calcium 9.4    Total Protein 6.5    Albumin 4.6    Total Bilirubin 0.4    Alkaline Phosphatase 66    AST (SGOT) 35    ALT (SGPT) 37    Total Cholesterol 144    Triglycerides " 232    HDL Cholesterol 43    LDL Cholesterol  63    Hemoglobin A1C 6.50    Microalbumin, Urine 36.3                Assessment and Plan   Diagnoses and all orders for this visit:    1. Type 2 diabetes mellitus with hyperglycemia, with long-term current use of insulin (Primary)  -     Lipid Panel  -     Hemoglobin A1c  -     Comprehensive Metabolic Panel  -     Microalbumin / Creatinine Urine Ratio - Urine, Clean Catch  -     Synjardy XR 12.5-1000 MG tablet sustained-release 24 hour; Take 1 tablet by mouth Daily.  Dispense: 90 tablet; Refill: 1    2. Hyperlipidemia associated with type 2 diabetes mellitus  -     rosuvastatin (CRESTOR) 20 MG tablet; Take 1 tablet by mouth Every Night.  Dispense: 90 tablet; Refill: 1    3. Diabetic peripheral neuropathy    4. Coronary artery disease involving native coronary artery of native heart without angina pectoris             Follow Up   Return in about 6 months (around 6/12/2025).    Labs today  Continue statin   Additional recommendations to follow as needed     Patient was given instructions and counseling regarding her condition or for health maintenance advice. Please see specific information pulled into the AVS if appropriate.     MEHUL Francis

## 2024-12-13 LAB
ALBUMIN SERPL-MCNC: 4.8 G/DL (ref 3.5–5.2)
ALBUMIN/CREAT UR: 26 MG/G CREAT (ref 0–29)
ALBUMIN/GLOB SERPL: 2 G/DL
ALP SERPL-CCNC: 68 U/L (ref 39–117)
ALT SERPL-CCNC: 35 U/L (ref 1–33)
AST SERPL-CCNC: 24 U/L (ref 1–32)
BILIRUB SERPL-MCNC: 0.5 MG/DL (ref 0–1.2)
BUN SERPL-MCNC: 12 MG/DL (ref 8–23)
BUN/CREAT SERPL: 17.1 (ref 7–25)
CALCIUM SERPL-MCNC: 10 MG/DL (ref 8.6–10.5)
CHLORIDE SERPL-SCNC: 104 MMOL/L (ref 98–107)
CHOLEST SERPL-MCNC: 163 MG/DL (ref 0–200)
CO2 SERPL-SCNC: 27.5 MMOL/L (ref 22–29)
CREAT SERPL-MCNC: 0.7 MG/DL (ref 0.57–1)
CREAT UR-MCNC: 107.1 MG/DL
EGFRCR SERPLBLD CKD-EPI 2021: 91.5 ML/MIN/1.73
GLOBULIN SER CALC-MCNC: 2.4 GM/DL
GLUCOSE SERPL-MCNC: 121 MG/DL (ref 65–99)
HBA1C MFR BLD: 6.4 % (ref 4.8–5.6)
HDLC SERPL-MCNC: 48 MG/DL (ref 40–60)
IMP & REVIEW OF LAB RESULTS: NORMAL
LDLC SERPL CALC-MCNC: 72 MG/DL (ref 0–100)
MICROALBUMIN UR-MCNC: 28 UG/ML
POTASSIUM SERPL-SCNC: 4.3 MMOL/L (ref 3.5–5.2)
PROT SERPL-MCNC: 7.2 G/DL (ref 6–8.5)
SODIUM SERPL-SCNC: 142 MMOL/L (ref 136–145)
TRIGL SERPL-MCNC: 266 MG/DL (ref 0–150)
VLDLC SERPL CALC-MCNC: 43 MG/DL (ref 5–40)

## 2024-12-30 NOTE — TELEPHONE ENCOUNTER
Rx Refill Note  Requested Prescriptions     Pending Prescriptions Disp Refills    glucose blood test strip 100 each 3     Sig: Ins will only cover testing 1 x day Dx: E 11.9      Last office visit with prescribing clinician: 12/12/2024   Last telemedicine visit with prescribing clinician: Visit date not found   Next office visit with prescribing clinician: 6/12/2025                         Would you like a call back once the refill request has been completed: [] Yes [] No    If the office needs to give you a call back, can they leave a voicemail: [] Yes [] No    Catalina Nguyễn MA  12/30/24, 11:39 EST

## 2025-03-13 DIAGNOSIS — E11.65 TYPE 2 DIABETES MELLITUS WITH HYPERGLYCEMIA, WITH LONG-TERM CURRENT USE OF INSULIN: ICD-10-CM

## 2025-03-13 DIAGNOSIS — E78.5 HYPERLIPIDEMIA ASSOCIATED WITH TYPE 2 DIABETES MELLITUS: ICD-10-CM

## 2025-03-13 DIAGNOSIS — E11.69 HYPERLIPIDEMIA ASSOCIATED WITH TYPE 2 DIABETES MELLITUS: ICD-10-CM

## 2025-03-13 DIAGNOSIS — Z79.4 TYPE 2 DIABETES MELLITUS WITH HYPERGLYCEMIA, WITH LONG-TERM CURRENT USE OF INSULIN: ICD-10-CM

## 2025-03-13 RX ORDER — EMPAGLIFLOZIN, METFORMIN HYDROCHLORIDE 12.5; 1 MG/1; MG/1
1 TABLET, EXTENDED RELEASE ORAL DAILY
Qty: 90 TABLET | Refills: 0 | Status: SHIPPED | OUTPATIENT
Start: 2025-03-13

## 2025-03-13 RX ORDER — ROSUVASTATIN CALCIUM 20 MG/1
20 TABLET, COATED ORAL NIGHTLY
Qty: 90 TABLET | Refills: 0 | Status: SHIPPED | OUTPATIENT
Start: 2025-03-13

## 2025-03-13 NOTE — TELEPHONE ENCOUNTER
Rx Refill Note  Requested Prescriptions     Pending Prescriptions Disp Refills    Synjardy XR 12.5-1000 MG tablet sustained-release 24 hour 90 tablet 1     Sig: Take 1 tablet by mouth Daily.    rosuvastatin (CRESTOR) 20 MG tablet 90 tablet 1     Sig: Take 1 tablet by mouth Every Night.      Last office visit with prescribing clinician: 12/12/2024   Last telemedicine visit with prescribing clinician: Visit date not found   Next office visit with prescribing clinician: 6/12/2025                         Would you like a call back once the refill request has been completed: [] Yes [] No    If the office needs to give you a call back, can they leave a voicemail: [] Yes [] No    Catalina Nguyễn MA  03/13/25, 12:22 EDT

## 2025-05-12 RX ORDER — DIGOXIN 250 MCG
250 TABLET ORAL DAILY
Qty: 30 TABLET | Refills: 7 | Status: SHIPPED | OUTPATIENT
Start: 2025-05-12

## 2025-06-12 ENCOUNTER — OFFICE VISIT (OUTPATIENT)
Dept: ENDOCRINOLOGY | Age: 75
End: 2025-06-12
Payer: MEDICARE

## 2025-06-12 VITALS
BODY MASS INDEX: 32.15 KG/M2 | OXYGEN SATURATION: 99 % | DIASTOLIC BLOOD PRESSURE: 74 MMHG | SYSTOLIC BLOOD PRESSURE: 132 MMHG | HEIGHT: 67 IN | TEMPERATURE: 98.5 F | WEIGHT: 204.8 LBS | HEART RATE: 78 BPM

## 2025-06-12 DIAGNOSIS — E78.5 HYPERLIPIDEMIA ASSOCIATED WITH TYPE 2 DIABETES MELLITUS: ICD-10-CM

## 2025-06-12 DIAGNOSIS — E11.69 HYPERLIPIDEMIA ASSOCIATED WITH TYPE 2 DIABETES MELLITUS: ICD-10-CM

## 2025-06-12 DIAGNOSIS — Z79.4 TYPE 2 DIABETES MELLITUS WITH HYPERGLYCEMIA, WITH LONG-TERM CURRENT USE OF INSULIN: Primary | ICD-10-CM

## 2025-06-12 DIAGNOSIS — E11.65 TYPE 2 DIABETES MELLITUS WITH HYPERGLYCEMIA, WITH LONG-TERM CURRENT USE OF INSULIN: Primary | ICD-10-CM

## 2025-06-12 RX ORDER — EMPAGLIFLOZIN, METFORMIN HYDROCHLORIDE 12.5; 1 MG/1; MG/1
1 TABLET, EXTENDED RELEASE ORAL DAILY
Qty: 90 TABLET | Refills: 1 | Status: SHIPPED | OUTPATIENT
Start: 2025-06-12

## 2025-06-12 RX ORDER — ROSUVASTATIN CALCIUM 20 MG/1
20 TABLET, COATED ORAL NIGHTLY
Qty: 90 TABLET | Refills: 1 | Status: SHIPPED | OUTPATIENT
Start: 2025-06-12

## 2025-06-12 NOTE — PROGRESS NOTES
"Chief Complaint  Diabetes    Subjective        Delaney Dunlap presents to [facility] ENDOCRINOLOGY  History of Present Illness    Type 2 dm ~10 years    Dm regimen: Synjardy XR 12.5-1000mg QD  PMH: pancreatitis, Glaucoma and mac degen, neuropathy, CAD      Checking BS BID  CV: rosuvastatin 20mg QD  diabetic eye exam: UTD 2024       Objective   Vital Signs:  /74   Pulse 78   Temp 98.5 °F (36.9 °C) (Oral)   Ht 170.2 cm (67.01\")   Wt 92.9 kg (204 lb 12.8 oz)   SpO2 99%   BMI 32.07 kg/m²   Estimated body mass index is 32.07 kg/m² as calculated from the following:    Height as of this encounter: 170.2 cm (67.01\").    Weight as of this encounter: 92.9 kg (204 lb 12.8 oz).          Physical Exam  Vitals reviewed.   Constitutional:       General: She is not in acute distress.  HENT:      Head: Normocephalic and atraumatic.   Cardiovascular:      Rate and Rhythm: Normal rate.   Pulmonary:      Effort: Pulmonary effort is normal. No respiratory distress.   Musculoskeletal:         General: No signs of injury. Normal range of motion.      Cervical back: Normal range of motion and neck supple.   Skin:     General: Skin is warm and dry.   Neurological:      Mental Status: She is alert and oriented to person, place, and time. Mental status is at baseline.   Psychiatric:         Mood and Affect: Mood normal.         Behavior: Behavior normal.         Thought Content: Thought content normal.         Judgment: Judgment normal.        Result Review :  The following data was reviewed by: MEHUL Francis on 06/12/2025:  Common labs          12/12/2024    11:49   Common Labs   Glucose 121    BUN 12    Creatinine 0.70    Sodium 142    Potassium 4.3    Chloride 104    Calcium 10.0    Albumin 4.8    Total Bilirubin 0.5    Alkaline Phosphatase 68    AST (SGOT) 24    ALT (SGPT) 35    Total Cholesterol 163    Triglycerides 266    HDL Cholesterol 48    LDL Cholesterol  72    Hemoglobin A1C 6.40  "   Microalbumin, Urine 28.0                Assessment and Plan   Diagnoses and all orders for this visit:    1. Type 2 diabetes mellitus with hyperglycemia, with long-term current use of insulin (Primary)  -     Synjardy XR 12.5-1000 MG tablet sustained-release 24 hour; Take 1 tablet by mouth Daily.  Dispense: 90 tablet; Refill: 1  -     Hemoglobin A1c  -     Basic Metabolic Panel    2. Hyperlipidemia associated with type 2 diabetes mellitus  -     rosuvastatin (CRESTOR) 20 MG tablet; Take 1 tablet by mouth Every Night.  Dispense: 90 tablet; Refill: 1             Follow Up   No follow-ups on file.    Labs today   Reviewed urinary hygiene on sglt-2   Additional recommendations to follow as needed based on results     Patient was given instructions and counseling regarding her condition or for health maintenance advice. Please see specific information pulled into the AVS if appropriate.       MEHUL Francis

## 2025-06-13 LAB
BUN SERPL-MCNC: 13 MG/DL (ref 8–27)
BUN/CREAT SERPL: 19 (ref 12–28)
CALCIUM SERPL-MCNC: 9.7 MG/DL (ref 8.7–10.3)
CHLORIDE SERPL-SCNC: 102 MMOL/L (ref 96–106)
CO2 SERPL-SCNC: 21 MMOL/L (ref 20–29)
CREAT SERPL-MCNC: 0.7 MG/DL (ref 0.57–1)
EGFRCR SERPLBLD CKD-EPI 2021: 91 ML/MIN/1.73
GLUCOSE SERPL-MCNC: 110 MG/DL (ref 70–99)
HBA1C MFR BLD: 6.4 % (ref 4.8–5.6)
POTASSIUM SERPL-SCNC: 4.2 MMOL/L (ref 3.5–5.2)
SODIUM SERPL-SCNC: 140 MMOL/L (ref 134–144)

## 2025-06-18 RX ORDER — LANCETS 33 GAUGE
1 EACH MISCELLANEOUS 2 TIMES DAILY
Qty: 200 EACH | Refills: 12 | Status: SHIPPED | OUTPATIENT
Start: 2025-06-18

## 2025-06-18 NOTE — TELEPHONE ENCOUNTER
Rx Refill Note  Requested Prescriptions     Pending Prescriptions Disp Refills    Lancets (OneTouch Delica Plus [id number]) misc 100 each 12      Last office visit with prescribing clinician: 6/12/2025   Last telemedicine visit with prescribing clinician: Visit date not found   Next office visit with prescribing clinician: 12/11/2025                         Would you like a call back once the refill request has been completed: [] Yes [] No    If the office needs to give you a call back, can they leave a voicemail: [] Yes [] No    Catalina Nguyễn MA  06/18/25, 08:37 EDT

## 2025-06-20 RX ORDER — LANCETS 33 GAUGE
1 EACH MISCELLANEOUS 2 TIMES DAILY
Qty: 200 EACH | Refills: 12 | OUTPATIENT
Start: 2025-06-20

## 2025-06-20 NOTE — TELEPHONE ENCOUNTER
Rx Refill Note  Requested Prescriptions     Pending Prescriptions Disp Refills    Lancets (OneTouch Delica Plus [id number]) misc 200 each 12     Si each by Other route 2 (Two) Times a Day.      Last office visit with prescribing clinician: 2025   Last telemedicine visit with prescribing clinician: Visit date not found   Next office visit with prescribing clinician: 2025                         Would you like a call back once the refill request has been completed: [] Yes [] No    If the office needs to give you a call back, can they leave a voicemail: [] Yes [] No    Catalina Nguyễn MA  25, 09:25 EDT

## 2025-07-10 ENCOUNTER — TELEPHONE (OUTPATIENT)
Dept: ENDOCRINOLOGY | Age: 75
End: 2025-07-10
Payer: MEDICARE

## 2025-07-10 NOTE — TELEPHONE ENCOUNTER
Caller: [facility] [address] [place] [address] [address] - [phone]  - [phone] FX    Relationship: Pharmacy    Best call back number: [phone]    What was the call regarding: PHARMACY CALLED STATING THEY FAXED OVER PAPERWORK FOR PT MEDICATION LANCETS AND IS NEEDING PAPERWORK FAXED BACK. FAX # [phone]. PLEASE ADVISE.

## 2025-08-15 ENCOUNTER — TELEPHONE (OUTPATIENT)
Dept: ENDOCRINOLOGY | Age: 75
End: 2025-08-15
Payer: MEDICARE

## 2025-08-15 RX ORDER — LANCETS 33 GAUGE
1 EACH MISCELLANEOUS DAILY
Qty: 100 EACH | Refills: 5 | Status: SHIPPED | OUTPATIENT
Start: 2025-08-15